# Patient Record
Sex: MALE | Race: WHITE | NOT HISPANIC OR LATINO | Employment: OTHER | URBAN - METROPOLITAN AREA
[De-identification: names, ages, dates, MRNs, and addresses within clinical notes are randomized per-mention and may not be internally consistent; named-entity substitution may affect disease eponyms.]

---

## 2017-06-07 ENCOUNTER — RECORDS - HEALTHEAST (OUTPATIENT)
Dept: LAB | Facility: CLINIC | Age: 72
End: 2017-06-07

## 2017-06-07 LAB
CHOLEST SERPL-MCNC: 178 MG/DL
FASTING STATUS PATIENT QL REPORTED: NO
HDLC SERPL-MCNC: 26 MG/DL
LDLC SERPL CALC-MCNC: 99 MG/DL
PSA SERPL-MCNC: 0.7 NG/ML (ref 0–6.5)
TRIGL SERPL-MCNC: 264 MG/DL

## 2017-06-16 DIAGNOSIS — Z96.641 STATUS POST TOTAL HIP REPLACEMENT, RIGHT: Primary | ICD-10-CM

## 2017-06-16 RX ORDER — AMOXICILLIN 500 MG/1
TABLET, FILM COATED ORAL
Qty: 4 TABLET | Refills: 3 | Status: ON HOLD | OUTPATIENT
Start: 2017-06-16 | End: 2019-05-03

## 2018-10-02 ENCOUNTER — RECORDS - HEALTHEAST (OUTPATIENT)
Dept: LAB | Facility: CLINIC | Age: 73
End: 2018-10-02

## 2018-10-02 LAB
ALBUMIN SERPL-MCNC: 4.1 G/DL (ref 3.5–5)
ALBUMIN UR-MCNC: NEGATIVE MG/DL
ALP SERPL-CCNC: 58 U/L (ref 45–120)
ALT SERPL W P-5'-P-CCNC: 18 U/L (ref 0–45)
ANION GAP SERPL CALCULATED.3IONS-SCNC: 8 MMOL/L (ref 5–18)
APPEARANCE UR: CLEAR
AST SERPL W P-5'-P-CCNC: 7 U/L (ref 0–40)
BASOPHILS # BLD AUTO: 0.1 THOU/UL (ref 0–0.2)
BASOPHILS NFR BLD AUTO: 1 % (ref 0–2)
BILIRUB SERPL-MCNC: 1.1 MG/DL (ref 0–1)
BILIRUB UR QL STRIP: NEGATIVE
BUN SERPL-MCNC: 25 MG/DL (ref 8–28)
CALCIUM SERPL-MCNC: 10.4 MG/DL (ref 8.5–10.5)
CHLORIDE BLD-SCNC: 107 MMOL/L (ref 98–107)
CO2 SERPL-SCNC: 28 MMOL/L (ref 22–31)
COLOR UR AUTO: NORMAL
CREAT SERPL-MCNC: 1.09 MG/DL (ref 0.7–1.3)
EOSINOPHIL # BLD AUTO: 0.3 THOU/UL (ref 0–0.4)
EOSINOPHIL NFR BLD AUTO: 4 % (ref 0–6)
ERYTHROCYTE [DISTWIDTH] IN BLOOD BY AUTOMATED COUNT: 12.6 % (ref 11–14.5)
GFR SERPL CREATININE-BSD FRML MDRD: >60 ML/MIN/1.73M2
GLUCOSE BLD-MCNC: 86 MG/DL (ref 70–125)
GLUCOSE UR STRIP-MCNC: NEGATIVE MG/DL
HCT VFR BLD AUTO: 48 % (ref 40–54)
HGB BLD-MCNC: 15.7 G/DL (ref 14–18)
HGB UR QL STRIP: NEGATIVE
KETONES UR STRIP-MCNC: NEGATIVE MG/DL
LEUKOCYTE ESTERASE UR QL STRIP: NEGATIVE
LYMPHOCYTES # BLD AUTO: 1.2 THOU/UL (ref 0.8–4.4)
LYMPHOCYTES NFR BLD AUTO: 15 % (ref 20–40)
MCH RBC QN AUTO: 30.8 PG (ref 27–34)
MCHC RBC AUTO-ENTMCNC: 32.7 G/DL (ref 32–36)
MCV RBC AUTO: 94 FL (ref 80–100)
MONOCYTES # BLD AUTO: 0.8 THOU/UL (ref 0–0.9)
MONOCYTES NFR BLD AUTO: 11 % (ref 2–10)
NEUTROPHILS # BLD AUTO: 5.3 THOU/UL (ref 2–7.7)
NEUTROPHILS NFR BLD AUTO: 70 % (ref 50–70)
NITRATE UR QL: NEGATIVE
PH UR STRIP: 5.5 [PH] (ref 4.5–8)
PLATELET # BLD AUTO: 263 THOU/UL (ref 140–440)
PMV BLD AUTO: 10.6 FL (ref 8.5–12.5)
POTASSIUM BLD-SCNC: 4.6 MMOL/L (ref 3.5–5)
PROT SERPL-MCNC: 7.2 G/DL (ref 6–8)
PSA SERPL-MCNC: 0.6 NG/ML (ref 0–6.5)
RBC # BLD AUTO: 5.1 MILL/UL (ref 4.4–6.2)
SODIUM SERPL-SCNC: 143 MMOL/L (ref 136–145)
SP GR UR STRIP: 1.01 (ref 1–1.03)
UROBILINOGEN UR STRIP-ACNC: NORMAL
WBC: 7.7 THOU/UL (ref 4–11)

## 2018-10-03 LAB
CHOLEST SERPL-MCNC: 180 MG/DL
HDLC SERPL-MCNC: 31 MG/DL
LDLC SERPL CALC-MCNC: 83 MG/DL
TRIGL SERPL-MCNC: 330 MG/DL

## 2019-04-02 ENCOUNTER — DOCUMENTATION ONLY (OUTPATIENT)
Dept: CARE COORDINATION | Facility: CLINIC | Age: 74
End: 2019-04-02

## 2019-04-04 ENCOUNTER — OFFICE VISIT (OUTPATIENT)
Dept: ORTHOPEDICS | Facility: CLINIC | Age: 74
End: 2019-04-04
Payer: MEDICARE

## 2019-04-04 ENCOUNTER — ANCILLARY PROCEDURE (OUTPATIENT)
Dept: GENERAL RADIOLOGY | Facility: CLINIC | Age: 74
End: 2019-04-04
Attending: ORTHOPAEDIC SURGERY
Payer: MEDICARE

## 2019-04-04 VITALS — HEIGHT: 70 IN | WEIGHT: 230 LBS | BODY MASS INDEX: 32.93 KG/M2

## 2019-04-04 DIAGNOSIS — M25.552 LEFT HIP PAIN: Primary | ICD-10-CM

## 2019-04-04 ASSESSMENT — ACTIVITIES OF DAILY LIVING (ADL)
ADL_SUBSCALE_SCORE: 52.94
ADL_SUM: 32
ADL_MEAN: 1.88

## 2019-04-04 ASSESSMENT — HOOS S4: HOW SEVERE IS YOUR HIP JOINT STIFFNESS AFTER FIRST WAKENING IN THE MORNING?: SEVERE

## 2019-04-04 ASSESSMENT — MIFFLIN-ST. JEOR: SCORE: 1789.52

## 2019-04-04 NOTE — LETTER
"4/4/2019       RE: Les Kraus  [address]  [facility] [address]     Dear Colleague,    Thank you for referring your patient, Les Kraus, to the [facility]. Please see a copy of my visit note below.    Chief Complaint: RECHECK (DOS: 8/17/16 Right MARC )     HPI: Les Kraus returns today in follow-up for his left hip. He reports that the right total hip is doing very well. The left side is very painful, limiting in his activities. He presents to discuss left total hip. He would like to have this done and his recovery complete for an August trip to the  to visit his daughter and grandchildren.     Medications and allergies are documented in the EMR and have been reviewed.    Current Outpatient Medications:      amLODIPine (NORVASC) 5 MG tablet, , Disp: , Rfl:      glucosamine-chondroitin 500-400 MG CAPS, Take 2 capsules by mouth daily, Disp: , Rfl:      amoxicillin (AMOXIL) 500 MG tablet, Take 4 tablets (2 g) 1 hour before dental procedure/cleaning. (Patient not taking: Reported on 4/4/2019), Disp: 4 tablet, Rfl: 3     ASPIRIN PO, Take 81 mg by mouth, Disp: , Rfl:      EPIPEN 2-ALEXX 0.3 MG/0.3ML injection, , Disp: , Rfl:      Multiple Vitamins-Minerals (MULTIVITAMIN & MINERAL PO), , Disp: , Rfl:      Psyllium (METAMUCIL PO), , Disp: , Rfl:   Allergies: Patient has no known allergies.    Physical Exam:  On physical examination the patient appears the stated age, is in no acute distress, affect is appropriate, and breathing is non-labored.  Ht 1.778 m (5' 10\")   Wt 104.3 kg (230 lb)   BMI 33.00 kg/m     Body mass index is 33 kg/m .  Gait: moderate to near severe antalgic gait favoring the left side   ROM: painful hip flexion, no IRF   Distally, the circulatory, motor, and sensation exam is intact with 5/5 EHL, gastroc-soleus, and tibialis anterior.  Sensation to light touch is intact.  Dorsalis pedis and posterior tibialis pulses are " palpable.  There are no sores on the feet, no bruising, and no lymphedema.    X-rays:    I reviewed the x-rays dated today.  Previous films reviewed.    Findings:  Normal progression for a right total hip arthroplasty without evidence of loosening or subsidence. The left hip shows end-stage OA with complete joint space destruction     Assessment: right hip doing well. Debilitating left hip pain associate with left hip joint space destruction. His desire to proceed with total hip is a reasonable one. We discussed the procedure and reviewed all the risks and benefits. All the patients questions were answered to the best of my ability.   Plan: left total hip    Referred Self    Again, thank you for allowing me to participate in the care of your patient.      Sincerely,    Espinoza Zaragoza MD

## 2019-04-04 NOTE — NURSING NOTE
"Reason For Visit:   Chief Complaint   Patient presents with     RECHECK     DOS: 8/17/16 Right MARC        Ht 1.778 m (5' 10\")   Wt 104.3 kg (230 lb)   BMI 33.00 kg/m      Pain Assessment  Patient Currently in Pain: Yes  0-10 Pain Scale: 4  Primary Pain Location: Hip(left hip anterior pain )    rAchie Monk ATC  "

## 2019-04-04 NOTE — PROGRESS NOTES
"Chief Complaint: RECHECK (DOS: 8/17/16 Right MARC )       HPI: Les Kraus returns today in follow-up for his left hip. He reports that the right total hip is doing very well. The left side is very painful, limiting in his activities. He presents to discuss left total hip. He would like to have this done and his recovery complete for an August trip to the  to visit his daughter and grandchildren.     Medications and allergies are documented in the EMR and have been reviewed.    Current Outpatient Medications:      amLODIPine (NORVASC) 5 MG tablet, , Disp: , Rfl:      glucosamine-chondroitin 500-400 MG CAPS, Take 2 capsules by mouth daily, Disp: , Rfl:      amoxicillin (AMOXIL) 500 MG tablet, Take 4 tablets (2 g) 1 hour before dental procedure/cleaning. (Patient not taking: Reported on 4/4/2019), Disp: 4 tablet, Rfl: 3     ASPIRIN PO, Take 81 mg by mouth, Disp: , Rfl:      EPIPEN 2-ALEXX 0.3 MG/0.3ML injection, , Disp: , Rfl:      Multiple Vitamins-Minerals (MULTIVITAMIN & MINERAL PO), , Disp: , Rfl:      Psyllium (METAMUCIL PO), , Disp: , Rfl:   Allergies: Patient has no known allergies.    Physical Exam:  On physical examination the patient appears the stated age, is in no acute distress, affect is appropriate, and breathing is non-labored.  Ht 1.778 m (5' 10\")   Wt 104.3 kg (230 lb)   BMI 33.00 kg/m    Body mass index is 33 kg/m .  Gait: moderate to near severe antalgic gait favoring the left side   ROM: painful hip flexion, no IRF   Distally, the circulatory, motor, and sensation exam is intact with 5/5 EHL, gastroc-soleus, and tibialis anterior.  Sensation to light touch is intact.  Dorsalis pedis and posterior tibialis pulses are palpable.  There are no sores on the feet, no bruising, and no lymphedema.    X-rays:    I reviewed the x-rays dated today.  Previous films reviewed.    Findings:  Normal progression for a right total hip arthroplasty without evidence of loosening or subsidence. The left hip shows " end-stage OA with complete joint space destruction     Assessment: right hip doing well. Debilitating left hip pain associate with left hip joint space destruction. His desire to proceed with total hip is a reasonable one. We discussed the procedure and reviewed all the risks and benefits. All the patients questions were answered to the best of my ability.   Plan: left total hip      Referred Self

## 2019-04-05 NOTE — NURSING NOTE
Teaching Flowsheet   Relevant Diagnosis: Left hip osteoarthritis  Teaching Topic: Left total hip arthroplasty.    Patient lives alone in [place]; he will contact his sister to see if she can come up to stay for after surgery; patient is checking with his supplemental insurance regarding TCU coverage. Health history positive for HTN on amlodipine; otherwise, health history negative. Patient had his right hip replaced 3 years ago.     Person(s) involved in teaching:   Patient     Motivation Level:  Asks Questions: Yes  Eager to Learn: Yes  Cooperative: Yes  Receptive (willing/able to accept information): Yes  Any cultural factors/Taoist beliefs that may influence understanding or compliance? No     Patient demonstrates understanding of the following:  Reason for the appointment, diagnosis and treatment plan: Yes  Knowledge of proper use of medications and conditions for which they are ordered (with special attention to potential side effects or drug interactions): Yes  Which situations necessitate calling provider and whom to contact: Yes     Teaching Concerns Addressed: Patent understands he will need a preoperative H&P within 30 days of the date of surgery.      Proper use and care of assistive devices. (medical equip, care aids, etc.): Yes  Nutritional needs and diet plan: NA  Pain management techniques: Yes  Wound Care: Yes  How and/when to access community resources: Yes     Instructional Materials Used/Given: Preoperative teaching packet, surgical soap, dental card.

## 2019-04-08 ENCOUNTER — DOCUMENTATION ONLY (OUTPATIENT)
Dept: ORTHOPEDICS | Facility: CLINIC | Age: 74
End: 2019-04-08

## 2019-04-23 ENCOUNTER — RECORDS - HEALTHEAST (OUTPATIENT)
Dept: LAB | Facility: CLINIC | Age: 74
End: 2019-04-23

## 2019-04-23 LAB
ALBUMIN SERPL-MCNC: 4.1 G/DL (ref 3.5–5)
ALP SERPL-CCNC: 52 U/L (ref 45–120)
ALT SERPL W P-5'-P-CCNC: 50 U/L (ref 0–45)
ANION GAP SERPL CALCULATED.3IONS-SCNC: 11 MMOL/L (ref 5–18)
AST SERPL W P-5'-P-CCNC: 17 U/L (ref 0–40)
BASOPHILS # BLD AUTO: 0.1 THOU/UL (ref 0–0.2)
BASOPHILS NFR BLD AUTO: 1 % (ref 0–2)
BILIRUB SERPL-MCNC: 0.7 MG/DL (ref 0–1)
BUN SERPL-MCNC: 22 MG/DL (ref 8–28)
CALCIUM SERPL-MCNC: 10.1 MG/DL (ref 8.5–10.5)
CHLORIDE BLD-SCNC: 110 MMOL/L (ref 98–107)
CO2 SERPL-SCNC: 22 MMOL/L (ref 22–31)
CREAT SERPL-MCNC: 1.05 MG/DL (ref 0.7–1.3)
EOSINOPHIL # BLD AUTO: 0.2 THOU/UL (ref 0–0.4)
EOSINOPHIL NFR BLD AUTO: 3 % (ref 0–6)
ERYTHROCYTE [DISTWIDTH] IN BLOOD BY AUTOMATED COUNT: 12.9 % (ref 11–14.5)
GFR SERPL CREATININE-BSD FRML MDRD: >60 ML/MIN/1.73M2
GLUCOSE BLD-MCNC: 95 MG/DL (ref 70–125)
HCT VFR BLD AUTO: 47.8 % (ref 40–54)
HGB BLD-MCNC: 15.5 G/DL (ref 14–18)
HGB BLD-MCNC: 15.7 G/DL (ref 14–18)
LYMPHOCYTES # BLD AUTO: 1 THOU/UL (ref 0.8–4.4)
LYMPHOCYTES NFR BLD AUTO: 14 % (ref 20–40)
MCH RBC QN AUTO: 30.6 PG (ref 27–34)
MCHC RBC AUTO-ENTMCNC: 32.8 G/DL (ref 32–36)
MCV RBC AUTO: 93 FL (ref 80–100)
MONOCYTES # BLD AUTO: 0.9 THOU/UL (ref 0–0.9)
MONOCYTES NFR BLD AUTO: 12 % (ref 2–10)
NEUTROPHILS # BLD AUTO: 5.2 THOU/UL (ref 2–7.7)
NEUTROPHILS NFR BLD AUTO: 71 % (ref 50–70)
PLATELET # BLD AUTO: 254 THOU/UL (ref 140–440)
PMV BLD AUTO: 10.1 FL (ref 8.5–12.5)
POTASSIUM BLD-SCNC: 4.2 MMOL/L (ref 3.5–5)
PROT SERPL-MCNC: 7.1 G/DL (ref 6–8)
RBC # BLD AUTO: 5.13 MILL/UL (ref 4.4–6.2)
SODIUM SERPL-SCNC: 143 MMOL/L (ref 136–145)
WBC: 7.4 THOU/UL (ref 4–11)

## 2019-04-29 NOTE — OR NURSING
Pt had questions about Rehab after surgery. Called and left message for [facility] for Dr. Zaragoza relaying patients concerns and if patient needs to be contacted about that pre-op or if Dr. Zaragoza will address it when he is inpatient post-op. Left PAN # for callback.

## 2019-05-01 ENCOUNTER — APPOINTMENT (OUTPATIENT)
Dept: GENERAL RADIOLOGY | Facility: CLINIC | Age: 74
DRG: 470 | End: 2019-05-01
Attending: ORTHOPAEDIC SURGERY
Payer: MEDICARE

## 2019-05-01 ENCOUNTER — ANESTHESIA EVENT (OUTPATIENT)
Dept: SURGERY | Facility: CLINIC | Age: 74
DRG: 470 | End: 2019-05-01
Payer: MEDICARE

## 2019-05-01 ENCOUNTER — HOSPITAL ENCOUNTER (INPATIENT)
Facility: CLINIC | Age: 74
LOS: 3 days | Discharge: NURSING, REHAB OR LONG-TERM CARE | DRG: 470 | End: 2019-05-04
Attending: ORTHOPAEDIC SURGERY | Admitting: ORTHOPAEDIC SURGERY
Payer: MEDICARE

## 2019-05-01 ENCOUNTER — ANESTHESIA (OUTPATIENT)
Dept: SURGERY | Facility: CLINIC | Age: 74
DRG: 470 | End: 2019-05-01
Payer: MEDICARE

## 2019-05-01 ENCOUNTER — APPOINTMENT (OUTPATIENT)
Dept: PHYSICAL THERAPY | Facility: CLINIC | Age: 74
DRG: 470 | End: 2019-05-01
Attending: ORTHOPAEDIC SURGERY
Payer: MEDICARE

## 2019-05-01 DIAGNOSIS — Z98.890 STATUS POST HIP SURGERY: Primary | ICD-10-CM

## 2019-05-01 LAB
ABO + RH BLD: NORMAL
ABO + RH BLD: NORMAL
ALBUMIN UR-MCNC: 10 MG/DL
APPEARANCE UR: CLEAR
BILIRUB UR QL STRIP: NEGATIVE
BLD GP AB SCN SERPL QL: NORMAL
BLOOD BANK CMNT PATIENT-IMP: NORMAL
COLOR UR AUTO: YELLOW
CREAT SERPL-MCNC: 1.12 MG/DL (ref 0.66–1.25)
GFR SERPL CREATININE-BSD FRML MDRD: 64 ML/MIN/{1.73_M2}
GLUCOSE BLDC GLUCOMTR-MCNC: 95 MG/DL (ref 70–99)
GLUCOSE UR STRIP-MCNC: NEGATIVE MG/DL
HGB UR QL STRIP: NEGATIVE
KETONES UR STRIP-MCNC: NEGATIVE MG/DL
LEUKOCYTE ESTERASE UR QL STRIP: NEGATIVE
MUCOUS THREADS #/AREA URNS LPF: PRESENT /LPF
NITRATE UR QL: NEGATIVE
PH UR STRIP: 5.5 PH (ref 5–7)
PLATELET # BLD AUTO: 221 10E9/L (ref 150–450)
RBC #/AREA URNS AUTO: 2 /HPF (ref 0–2)
SOURCE: ABNORMAL
SP GR UR STRIP: 1.02 (ref 1–1.03)
SPECIMEN EXP DATE BLD: NORMAL
UROBILINOGEN UR STRIP-MCNC: NORMAL MG/DL (ref 0–2)
WBC #/AREA URNS AUTO: 1 /HPF (ref 0–5)

## 2019-05-01 PROCEDURE — A9270 NON-COVERED ITEM OR SERVICE: HCPCS | Performed by: STUDENT IN AN ORGANIZED HEALTH CARE EDUCATION/TRAINING PROGRAM

## 2019-05-01 PROCEDURE — 36000062 ZZH SURGERY LEVEL 4 1ST 30 MIN - UMMC: Performed by: ORTHOPAEDIC SURGERY

## 2019-05-01 PROCEDURE — 27210794 ZZH OR GENERAL SUPPLY STERILE: Performed by: ORTHOPAEDIC SURGERY

## 2019-05-01 PROCEDURE — 36000064 ZZH SURGERY LEVEL 4 EA 15 ADDTL MIN - UMMC: Performed by: ORTHOPAEDIC SURGERY

## 2019-05-01 PROCEDURE — 25800025 ZZH RX 258: Performed by: ORTHOPAEDIC SURGERY

## 2019-05-01 PROCEDURE — 85049 AUTOMATED PLATELET COUNT: CPT | Performed by: ORTHOPAEDIC SURGERY

## 2019-05-01 PROCEDURE — 25000128 H RX IP 250 OP 636: Performed by: NURSE ANESTHETIST, CERTIFIED REGISTERED

## 2019-05-01 PROCEDURE — 25000566 ZZH SEVOFLURANE, EA 15 MIN: Performed by: ORTHOPAEDIC SURGERY

## 2019-05-01 PROCEDURE — 97530 THERAPEUTIC ACTIVITIES: CPT | Mod: GP | Performed by: PHYSICAL THERAPIST

## 2019-05-01 PROCEDURE — 25000125 ZZHC RX 250: Performed by: ORTHOPAEDIC SURGERY

## 2019-05-01 PROCEDURE — 82565 ASSAY OF CREATININE: CPT | Performed by: ORTHOPAEDIC SURGERY

## 2019-05-01 PROCEDURE — 86901 BLOOD TYPING SEROLOGIC RH(D): CPT | Performed by: ORTHOPAEDIC SURGERY

## 2019-05-01 PROCEDURE — 25000125 ZZHC RX 250: Performed by: NURSE ANESTHETIST, CERTIFIED REGISTERED

## 2019-05-01 PROCEDURE — 25800030 ZZH RX IP 258 OP 636: Performed by: ANESTHESIOLOGY

## 2019-05-01 PROCEDURE — 86900 BLOOD TYPING SEROLOGIC ABO: CPT | Performed by: ORTHOPAEDIC SURGERY

## 2019-05-01 PROCEDURE — 97110 THERAPEUTIC EXERCISES: CPT | Mod: GP | Performed by: PHYSICAL THERAPIST

## 2019-05-01 PROCEDURE — 71000014 ZZH RECOVERY PHASE 1 LEVEL 2 FIRST HR: Performed by: ORTHOPAEDIC SURGERY

## 2019-05-01 PROCEDURE — C1713 ANCHOR/SCREW BN/BN,TIS/BN: HCPCS | Performed by: ORTHOPAEDIC SURGERY

## 2019-05-01 PROCEDURE — 12000001 ZZH R&B MED SURG/OB UMMC

## 2019-05-01 PROCEDURE — 37000009 ZZH ANESTHESIA TECHNICAL FEE, EACH ADDTL 15 MIN: Performed by: ORTHOPAEDIC SURGERY

## 2019-05-01 PROCEDURE — A9270 NON-COVERED ITEM OR SERVICE: HCPCS | Performed by: ORTHOPAEDIC SURGERY

## 2019-05-01 PROCEDURE — 99207 ZZC CONSULT E&M CHANGED TO INITIAL LEVEL: CPT | Performed by: PHYSICIAN ASSISTANT

## 2019-05-01 PROCEDURE — 40000275 ZZH STATISTIC RCP TIME EA 10 MIN

## 2019-05-01 PROCEDURE — 25000128 H RX IP 250 OP 636: Performed by: ORTHOPAEDIC SURGERY

## 2019-05-01 PROCEDURE — 86850 RBC ANTIBODY SCREEN: CPT | Performed by: ORTHOPAEDIC SURGERY

## 2019-05-01 PROCEDURE — 25000132 ZZH RX MED GY IP 250 OP 250 PS 637: Performed by: ORTHOPAEDIC SURGERY

## 2019-05-01 PROCEDURE — 0SRB02A REPLACEMENT OF LEFT HIP JOINT WITH METAL ON POLYETHYLENE SYNTHETIC SUBSTITUTE, UNCEMENTED, OPEN APPROACH: ICD-10-PCS | Performed by: ORTHOPAEDIC SURGERY

## 2019-05-01 PROCEDURE — 25800030 ZZH RX IP 258 OP 636: Performed by: PHYSICIAN ASSISTANT

## 2019-05-01 PROCEDURE — 99222 1ST HOSP IP/OBS MODERATE 55: CPT | Performed by: PHYSICIAN ASSISTANT

## 2019-05-01 PROCEDURE — 25800030 ZZH RX IP 258 OP 636: Performed by: ORTHOPAEDIC SURGERY

## 2019-05-01 PROCEDURE — 36415 COLL VENOUS BLD VENIPUNCTURE: CPT | Performed by: ORTHOPAEDIC SURGERY

## 2019-05-01 PROCEDURE — 25000132 ZZH RX MED GY IP 250 OP 250 PS 637: Performed by: STUDENT IN AN ORGANIZED HEALTH CARE EDUCATION/TRAINING PROGRAM

## 2019-05-01 PROCEDURE — C1776 JOINT DEVICE (IMPLANTABLE): HCPCS | Performed by: ORTHOPAEDIC SURGERY

## 2019-05-01 PROCEDURE — 25000128 H RX IP 250 OP 636: Performed by: STUDENT IN AN ORGANIZED HEALTH CARE EDUCATION/TRAINING PROGRAM

## 2019-05-01 PROCEDURE — 81001 URINALYSIS AUTO W/SCOPE: CPT | Performed by: ORTHOPAEDIC SURGERY

## 2019-05-01 PROCEDURE — 97161 PT EVAL LOW COMPLEX 20 MIN: CPT | Mod: GP | Performed by: PHYSICAL THERAPIST

## 2019-05-01 PROCEDURE — 00000146 ZZHCL STATISTIC GLUCOSE BY METER IP

## 2019-05-01 PROCEDURE — 37000008 ZZH ANESTHESIA TECHNICAL FEE, 1ST 30 MIN: Performed by: ORTHOPAEDIC SURGERY

## 2019-05-01 PROCEDURE — 40000170 ZZH STATISTIC PRE-PROCEDURE ASSESSMENT II: Performed by: ORTHOPAEDIC SURGERY

## 2019-05-01 PROCEDURE — 25800030 ZZH RX IP 258 OP 636: Performed by: NURSE ANESTHETIST, CERTIFIED REGISTERED

## 2019-05-01 PROCEDURE — 40000986 XR PELVIS AND HIP PORTABLE LEFT 2 VIEWS

## 2019-05-01 PROCEDURE — 25000128 H RX IP 250 OP 636: Performed by: ANESTHESIOLOGY

## 2019-05-01 DEVICE — IMP SCR ACET SNN SPHERICAL HEAD 6.5X25MM 71332525: Type: IMPLANTABLE DEVICE | Site: HIP | Status: FUNCTIONAL

## 2019-05-01 DEVICE — IMP STEM FEM HIP SNN SYNERGY POROUS SZ 12 HO 71306112: Type: IMPLANTABLE DEVICE | Site: HIP | Status: FUNCTIONAL

## 2019-05-01 DEVICE — IMP LINER SNR ACET R3 XLPE 0DEG 36X56MM 71332756: Type: IMPLANTABLE DEVICE | Site: HIP | Status: FUNCTIONAL

## 2019-05-01 DEVICE — IMP HEAD FEMORAL SNN BIPOLAR COBALT 36MM +8 71303608: Type: IMPLANTABLE DEVICE | Site: HIP | Status: FUNCTIONAL

## 2019-05-01 DEVICE — IMP SCR ACET SNN SPHERICAL HEAD 6.5X40MM 71332540: Type: IMPLANTABLE DEVICE | Site: HIP | Status: FUNCTIONAL

## 2019-05-01 DEVICE — IMP SHELL SNR ACET R3 3H 56MM 71335556: Type: IMPLANTABLE DEVICE | Site: HIP | Status: FUNCTIONAL

## 2019-05-01 RX ORDER — FENTANYL CITRATE 50 UG/ML
25-50 INJECTION, SOLUTION INTRAMUSCULAR; INTRAVENOUS
Status: DISCONTINUED | OUTPATIENT
Start: 2019-05-01 | End: 2019-05-01 | Stop reason: HOSPADM

## 2019-05-01 RX ORDER — HYDROMORPHONE HYDROCHLORIDE 1 MG/ML
.3-.5 INJECTION, SOLUTION INTRAMUSCULAR; INTRAVENOUS; SUBCUTANEOUS
Status: DISCONTINUED | OUTPATIENT
Start: 2019-05-01 | End: 2019-05-04 | Stop reason: HOSPADM

## 2019-05-01 RX ORDER — KETOROLAC TROMETHAMINE 30 MG/ML
INJECTION, SOLUTION INTRAMUSCULAR; INTRAVENOUS PRN
Status: DISCONTINUED | OUTPATIENT
Start: 2019-05-01 | End: 2019-05-01

## 2019-05-01 RX ORDER — BISACODYL 10 MG
10 SUPPOSITORY, RECTAL RECTAL DAILY
Status: DISCONTINUED | OUTPATIENT
Start: 2019-05-02 | End: 2019-05-04 | Stop reason: HOSPADM

## 2019-05-01 RX ORDER — ONDANSETRON 4 MG/1
4 TABLET, ORALLY DISINTEGRATING ORAL EVERY 6 HOURS PRN
Status: DISCONTINUED | OUTPATIENT
Start: 2019-05-01 | End: 2019-05-04 | Stop reason: HOSPADM

## 2019-05-01 RX ORDER — DIPHENHYDRAMINE HCL 12.5MG/5ML
12.5 LIQUID (ML) ORAL EVERY 6 HOURS PRN
Status: DISCONTINUED | OUTPATIENT
Start: 2019-05-01 | End: 2019-05-01

## 2019-05-01 RX ORDER — PROPOFOL 10 MG/ML
INJECTION, EMULSION INTRAVENOUS PRN
Status: DISCONTINUED | OUTPATIENT
Start: 2019-05-01 | End: 2019-05-01

## 2019-05-01 RX ORDER — ONDANSETRON 2 MG/ML
INJECTION INTRAMUSCULAR; INTRAVENOUS PRN
Status: DISCONTINUED | OUTPATIENT
Start: 2019-05-01 | End: 2019-05-01

## 2019-05-01 RX ORDER — OXYCODONE HYDROCHLORIDE 5 MG/1
5-10 TABLET ORAL EVERY 4 HOURS PRN
Status: DISCONTINUED | OUTPATIENT
Start: 2019-05-01 | End: 2019-05-04 | Stop reason: HOSPADM

## 2019-05-01 RX ORDER — LIDOCAINE 40 MG/G
CREAM TOPICAL
Status: DISCONTINUED | OUTPATIENT
Start: 2019-05-01 | End: 2019-05-04 | Stop reason: HOSPADM

## 2019-05-01 RX ORDER — SODIUM CHLORIDE, SODIUM LACTATE, POTASSIUM CHLORIDE, CALCIUM CHLORIDE 600; 310; 30; 20 MG/100ML; MG/100ML; MG/100ML; MG/100ML
INJECTION, SOLUTION INTRAVENOUS CONTINUOUS
Status: DISCONTINUED | OUTPATIENT
Start: 2019-05-01 | End: 2019-05-01 | Stop reason: HOSPADM

## 2019-05-01 RX ORDER — NALOXONE HYDROCHLORIDE 0.4 MG/ML
.1-.4 INJECTION, SOLUTION INTRAMUSCULAR; INTRAVENOUS; SUBCUTANEOUS
Status: DISCONTINUED | OUTPATIENT
Start: 2019-05-01 | End: 2019-05-01

## 2019-05-01 RX ORDER — GABAPENTIN 100 MG/1
100 CAPSULE ORAL
Status: COMPLETED | OUTPATIENT
Start: 2019-05-01 | End: 2019-05-01

## 2019-05-01 RX ORDER — METHOCARBAMOL 500 MG/1
500 TABLET, FILM COATED ORAL 4 TIMES DAILY PRN
Status: DISCONTINUED | OUTPATIENT
Start: 2019-05-01 | End: 2019-05-04 | Stop reason: HOSPADM

## 2019-05-01 RX ORDER — CEFAZOLIN SODIUM 2 G/100ML
2 INJECTION, SOLUTION INTRAVENOUS
Status: COMPLETED | OUTPATIENT
Start: 2019-05-01 | End: 2019-05-01

## 2019-05-01 RX ORDER — CEFAZOLIN SODIUM 2 G/100ML
2 INJECTION, SOLUTION INTRAVENOUS EVERY 8 HOURS
Status: COMPLETED | OUTPATIENT
Start: 2019-05-01 | End: 2019-05-02

## 2019-05-01 RX ORDER — ONDANSETRON 2 MG/ML
4 INJECTION INTRAMUSCULAR; INTRAVENOUS EVERY 6 HOURS PRN
Status: DISCONTINUED | OUTPATIENT
Start: 2019-05-01 | End: 2019-05-04 | Stop reason: HOSPADM

## 2019-05-01 RX ORDER — ACETAMINOPHEN 325 MG/1
975 TABLET ORAL EVERY 8 HOURS
Status: DISCONTINUED | OUTPATIENT
Start: 2019-05-01 | End: 2019-05-04 | Stop reason: HOSPADM

## 2019-05-01 RX ORDER — AMOXICILLIN 250 MG
2 CAPSULE ORAL 2 TIMES DAILY
Status: DISCONTINUED | OUTPATIENT
Start: 2019-05-01 | End: 2019-05-04 | Stop reason: HOSPADM

## 2019-05-01 RX ORDER — FENTANYL CITRATE 50 UG/ML
INJECTION, SOLUTION INTRAMUSCULAR; INTRAVENOUS PRN
Status: DISCONTINUED | OUTPATIENT
Start: 2019-05-01 | End: 2019-05-01

## 2019-05-01 RX ORDER — LIDOCAINE HYDROCHLORIDE 20 MG/ML
INJECTION, SOLUTION INFILTRATION; PERINEURAL PRN
Status: DISCONTINUED | OUTPATIENT
Start: 2019-05-01 | End: 2019-05-01

## 2019-05-01 RX ORDER — ONDANSETRON 2 MG/ML
4 INJECTION INTRAMUSCULAR; INTRAVENOUS EVERY 30 MIN PRN
Status: DISCONTINUED | OUTPATIENT
Start: 2019-05-01 | End: 2019-05-01 | Stop reason: HOSPADM

## 2019-05-01 RX ORDER — CELECOXIB 200 MG/1
200 CAPSULE ORAL ONCE
Status: COMPLETED | OUTPATIENT
Start: 2019-05-01 | End: 2019-05-01

## 2019-05-01 RX ORDER — DEXAMETHASONE SODIUM PHOSPHATE 4 MG/ML
INJECTION, SOLUTION INTRA-ARTICULAR; INTRALESIONAL; INTRAMUSCULAR; INTRAVENOUS; SOFT TISSUE PRN
Status: DISCONTINUED | OUTPATIENT
Start: 2019-05-01 | End: 2019-05-01

## 2019-05-01 RX ORDER — AMLODIPINE BESYLATE 5 MG/1
5 TABLET ORAL DAILY
Status: DISCONTINUED | OUTPATIENT
Start: 2019-05-02 | End: 2019-05-04 | Stop reason: HOSPADM

## 2019-05-01 RX ORDER — ACETAMINOPHEN 325 MG/1
975 TABLET ORAL ONCE
Status: COMPLETED | OUTPATIENT
Start: 2019-05-01 | End: 2019-05-01

## 2019-05-01 RX ORDER — AMOXICILLIN 250 MG
1 CAPSULE ORAL 2 TIMES DAILY
Status: DISCONTINUED | OUTPATIENT
Start: 2019-05-01 | End: 2019-05-04 | Stop reason: HOSPADM

## 2019-05-01 RX ORDER — CEFAZOLIN SODIUM 1 G/3ML
1 INJECTION, POWDER, FOR SOLUTION INTRAMUSCULAR; INTRAVENOUS SEE ADMIN INSTRUCTIONS
Status: DISCONTINUED | OUTPATIENT
Start: 2019-05-01 | End: 2019-05-01 | Stop reason: HOSPADM

## 2019-05-01 RX ORDER — DIPHENHYDRAMINE HYDROCHLORIDE 50 MG/ML
12.5 INJECTION INTRAMUSCULAR; INTRAVENOUS EVERY 6 HOURS PRN
Status: DISCONTINUED | OUTPATIENT
Start: 2019-05-01 | End: 2019-05-01

## 2019-05-01 RX ORDER — NALOXONE HYDROCHLORIDE 0.4 MG/ML
.1-.4 INJECTION, SOLUTION INTRAMUSCULAR; INTRAVENOUS; SUBCUTANEOUS
Status: DISCONTINUED | OUTPATIENT
Start: 2019-05-01 | End: 2019-05-02

## 2019-05-01 RX ORDER — LIDOCAINE 4 G/G
1-3 PATCH TOPICAL
Status: DISCONTINUED | OUTPATIENT
Start: 2019-05-02 | End: 2019-05-04 | Stop reason: HOSPADM

## 2019-05-01 RX ORDER — HYDROMORPHONE HYDROCHLORIDE 1 MG/ML
.3-.5 INJECTION, SOLUTION INTRAMUSCULAR; INTRAVENOUS; SUBCUTANEOUS EVERY 5 MIN PRN
Status: DISCONTINUED | OUTPATIENT
Start: 2019-05-01 | End: 2019-05-01 | Stop reason: HOSPADM

## 2019-05-01 RX ORDER — MAGNESIUM HYDROXIDE 1200 MG/15ML
LIQUID ORAL PRN
Status: DISCONTINUED | OUTPATIENT
Start: 2019-05-01 | End: 2019-05-01 | Stop reason: HOSPADM

## 2019-05-01 RX ORDER — SODIUM CHLORIDE, SODIUM LACTATE, POTASSIUM CHLORIDE, CALCIUM CHLORIDE 600; 310; 30; 20 MG/100ML; MG/100ML; MG/100ML; MG/100ML
INJECTION, SOLUTION INTRAVENOUS CONTINUOUS
Status: DISCONTINUED | OUTPATIENT
Start: 2019-05-01 | End: 2019-05-03

## 2019-05-01 RX ORDER — ONDANSETRON 4 MG/1
4 TABLET, ORALLY DISINTEGRATING ORAL EVERY 30 MIN PRN
Status: DISCONTINUED | OUTPATIENT
Start: 2019-05-01 | End: 2019-05-01 | Stop reason: HOSPADM

## 2019-05-01 RX ORDER — ACETAMINOPHEN 325 MG/1
650 TABLET ORAL EVERY 4 HOURS PRN
Status: DISCONTINUED | OUTPATIENT
Start: 2019-05-04 | End: 2019-05-04 | Stop reason: HOSPADM

## 2019-05-01 RX ADMIN — PROPOFOL 200 MG: 10 INJECTION, EMULSION INTRAVENOUS at 07:35

## 2019-05-01 RX ADMIN — PHENYLEPHRINE HYDROCHLORIDE 0.3 MCG/KG/MIN: 10 INJECTION, SOLUTION INTRAMUSCULAR; INTRAVENOUS; SUBCUTANEOUS at 08:32

## 2019-05-01 RX ADMIN — ONDANSETRON 4 MG: 2 INJECTION INTRAMUSCULAR; INTRAVENOUS at 09:34

## 2019-05-01 RX ADMIN — MAGNESIUM HYDROXIDE 15 ML: 400 SUSPENSION ORAL at 19:35

## 2019-05-01 RX ADMIN — SODIUM CHLORIDE 1 G: 9 INJECTION, SOLUTION INTRAVENOUS at 07:46

## 2019-05-01 RX ADMIN — MIDAZOLAM 2 MG: 1 INJECTION INTRAMUSCULAR; INTRAVENOUS at 07:25

## 2019-05-01 RX ADMIN — ACETAMINOPHEN 975 MG: 325 TABLET, FILM COATED ORAL at 06:26

## 2019-05-01 RX ADMIN — ROCURONIUM BROMIDE 20 MG: 10 INJECTION INTRAVENOUS at 09:00

## 2019-05-01 RX ADMIN — ROCURONIUM BROMIDE 50 MG: 10 INJECTION INTRAVENOUS at 07:35

## 2019-05-01 RX ADMIN — DEXAMETHASONE SODIUM PHOSPHATE 8 MG: 4 INJECTION, SOLUTION INTRAMUSCULAR; INTRAVENOUS at 08:34

## 2019-05-01 RX ADMIN — Medication 0.5 MG: at 11:00

## 2019-05-01 RX ADMIN — FENTANYL CITRATE 50 MCG: 50 INJECTION, SOLUTION INTRAMUSCULAR; INTRAVENOUS at 08:07

## 2019-05-01 RX ADMIN — PHENYLEPHRINE HYDROCHLORIDE 50 MCG: 10 INJECTION, SOLUTION INTRAMUSCULAR; INTRAVENOUS; SUBCUTANEOUS at 08:17

## 2019-05-01 RX ADMIN — ACETAMINOPHEN 975 MG: 325 TABLET, FILM COATED ORAL at 19:35

## 2019-05-01 RX ADMIN — PHENYLEPHRINE HYDROCHLORIDE 100 MCG: 10 INJECTION, SOLUTION INTRAMUSCULAR; INTRAVENOUS; SUBCUTANEOUS at 08:27

## 2019-05-01 RX ADMIN — GABAPENTIN 100 MG: 100 CAPSULE ORAL at 06:26

## 2019-05-01 RX ADMIN — CEFAZOLIN 1 G: 1 INJECTION, POWDER, FOR SOLUTION INTRAMUSCULAR; INTRAVENOUS at 09:30

## 2019-05-01 RX ADMIN — ROCURONIUM BROMIDE 20 MG: 10 INJECTION INTRAVENOUS at 08:26

## 2019-05-01 RX ADMIN — CEFAZOLIN SODIUM 2 G: 2 INJECTION, SOLUTION INTRAVENOUS at 17:14

## 2019-05-01 RX ADMIN — SODIUM CHLORIDE 1 G: 9 INJECTION, SOLUTION INTRAVENOUS at 09:26

## 2019-05-01 RX ADMIN — SODIUM CHLORIDE, POTASSIUM CHLORIDE, SODIUM LACTATE AND CALCIUM CHLORIDE: 600; 310; 30; 20 INJECTION, SOLUTION INTRAVENOUS at 07:27

## 2019-05-01 RX ADMIN — FENTANYL CITRATE 100 MCG: 50 INJECTION, SOLUTION INTRAMUSCULAR; INTRAVENOUS at 07:35

## 2019-05-01 RX ADMIN — KETOROLAC TROMETHAMINE 15 MG: 30 INJECTION, SOLUTION INTRAMUSCULAR at 09:41

## 2019-05-01 RX ADMIN — PHENYLEPHRINE HYDROCHLORIDE 100 MCG: 10 INJECTION, SOLUTION INTRAMUSCULAR; INTRAVENOUS; SUBCUTANEOUS at 07:57

## 2019-05-01 RX ADMIN — CELECOXIB 200 MG: 200 CAPSULE ORAL at 06:26

## 2019-05-01 RX ADMIN — FAMOTIDINE 20 MG: 10 INJECTION, SOLUTION INTRAVENOUS at 09:30

## 2019-05-01 RX ADMIN — CEFAZOLIN SODIUM 2 G: 2 INJECTION, SOLUTION INTRAVENOUS at 07:35

## 2019-05-01 RX ADMIN — SODIUM CHLORIDE, POTASSIUM CHLORIDE, SODIUM LACTATE AND CALCIUM CHLORIDE: 600; 310; 30; 20 INJECTION, SOLUTION INTRAVENOUS at 22:36

## 2019-05-01 RX ADMIN — SENNOSIDES AND DOCUSATE SODIUM 2 TABLET: 8.6; 5 TABLET ORAL at 19:35

## 2019-05-01 RX ADMIN — SUGAMMADEX 300 MG: 100 INJECTION, SOLUTION INTRAVENOUS at 10:17

## 2019-05-01 RX ADMIN — LIDOCAINE HYDROCHLORIDE 100 MG: 20 INJECTION, SOLUTION INFILTRATION; PERINEURAL at 07:35

## 2019-05-01 ASSESSMENT — ACTIVITIES OF DAILY LIVING (ADL)
DRESS: 0-->INDEPENDENT
FALL_HISTORY_WITHIN_LAST_SIX_MONTHS: NO
SWALLOWING: 0-->SWALLOWS FOODS/LIQUIDS WITHOUT DIFFICULTY
AMBULATION: 0-->INDEPENDENT
RETIRED_COMMUNICATION: 0-->UNDERSTANDS/COMMUNICATES WITHOUT DIFFICULTY
TRANSFERRING: 0-->INDEPENDENT
RETIRED_EATING: 0-->INDEPENDENT
COGNITION: 0 - NO COGNITION ISSUES REPORTED
ADLS_ACUITY_SCORE: 12
TOILETING: 0-->INDEPENDENT
ADLS_ACUITY_SCORE: 12
BATHING: 0-->INDEPENDENT

## 2019-05-01 NOTE — PLAN OF CARE
Discharge Planner PT   Patient plan for discharge: TCU  Current status: PT evaluation complete and treatment initiated patient agreeable to treatment. Completed supine<>sit Joleen to L LE, sit<>stand x2 Joleen in order to gain balance.  Sitting /88, completed sit<>stand able to stand for 45 sec and take lateral steps toward HOB.  Patient on 2L when entering room, weaned to 1L while doing therapy O2 91-93%, returned to 2L at end of session.   Barriers to return to prior living situation: bed mobility, transfers, precautions, pain  Recommendations for discharge: TCU  Rationale for recommendations: Patient requiring PT in order to maximize independence in order to PLOF, patient lives alone.         Entered by: Kenya Linder 05/01/2019 5:19 PM

## 2019-05-01 NOTE — PLAN OF CARE
Pt AxOx4. Pt arrived to unit at 1200 from PACU. Pt able to void spontaneously without difficulty in the urinal, PVR was 191 mL. Last BM 4/30/19, hypoactive bowel sounds, not passing gas yet, but denies nausea. Has been able to eat crackers will advance to regular diet. Lung sounds clear in all fields, no SOB, no Cp. L hip dressing is CDI. Denies any pain had IV dilaudid in PACU. CMS intact, denies numbness and tingling. PIV in R hand infusing. CAPNO on with 2L of O2. Has CPAP from home for overnight. Able to make needs known with call light. Will continue to monitor.

## 2019-05-01 NOTE — ANESTHESIA PREPROCEDURE EVALUATION
"Anesthesia Pre-Procedure Evaluation    Patient: Les Kraus   MRN:     [id number] Gender:   male   Age:    73 year old :      1945        Preoperative Diagnosis: Left Hip Osteoarthritis   Procedure(s):  Left Total Hip Arthroplasty     Past Medical History:   Diagnosis Date     Exercise induced anaphylaxis     After eating certain foods. No issues for several years. Allergist could not determine cause.     Hypertension      MEHUL (obstructive sleep apnea)       Past Surgical History:   Procedure Laterality Date     ARTHROPLASTY HIP Right 2016    Procedure: ARTHROPLASTY HIP;  Surgeon: Espinoza Zaragoza MD;  Location: [facility] OR     COLONOSCOPY       EXTRACTION(S) DENTAL      [place] Teeth Removal     HERNIA REPAIR                     PHYSICAL EXAM:   Mental Status/Neuro: A/A/O   Airway: Facies: Feasible  Mallampati: I  Mouth/Opening: Full  TM distance: > 6 cm  Neck ROM: Full   Respiratory: Auscultation: CTAB     Resp. Rate: Normal     Resp. Effort: Normal      CV: Rhythm: Regular  Rate: Age appropriate  Heart: Normal Sounds   Comments:      Dental: Normal                  Lab Results   Component Value Date    WBC 9.8 2016    HGB 12.3 (L) 2016    HCT 36.7 (L) 2016     2016     2016    POTASSIUM 3.6 2016    CHLORIDE 108 2016    CO2 28 2016    BUN 12 2016    CR 0.99 2016    GLC 98 2016    LOUIS 8.6 2016       Preop Vitals  BP Readings from Last 3 Encounters:   19 (!) 153/98   11/15/16 141/85   16 130/82    Pulse Readings from Last 3 Encounters:   11/15/16 68   16 77   16 87      Resp Readings from Last 3 Encounters:   19 24   16 16   14 14    SpO2 Readings from Last 3 Encounters:   19 95%   16 96%   14 96%      Temp Readings from Last 1 Encounters:   19 36.7  C (98.1  F) (Oral)    Ht Readings from Last 1 Encounters:   19 1.778 m (5' 10\")      Wt Readings from " "Last 1 Encounters:   04/04/19 104.3 kg (230 lb)    Estimated body mass index is 33 kg/m  as calculated from the following:    Height as of 4/4/19: 1.778 m (5' 10\").    Weight as of 4/4/19: 104.3 kg (230 lb).     LDA:  Peripheral IV 08/17/16 Right Hand (Active)   Number of days: 987       Peripheral IV 05/01/19 Right Hand (Active)   Number of days: 0       ETT (adult) (Active)   Number of days: 987            Assessment:   ASA SCORE: 2       Documentation: H&P complete; Preop Testing complete; Consents complete   Proceeding: Proceed without further delay  Tobacco Use:  NO Active use of Tobacco/UNKNOWN Tobacco use status     Plan:   Anes. Type:  General   Pre-Induction: Midazolam IV; Acetaminophen PO   Induction:  IV (Standard)   Airway: Oral ETT   Access/Monitoring: PIV   Maintenance: Balanced   Emergence: Procedure Site   Logistics: Same Day Surgery     Postop Pain/Sedation Strategy:  Standard-Options: Opioids PRN     PONV Management:  Adult Risk Factors:, Non-Smoker, Postop Opioids                         Jenaro Rodgers DO  "

## 2019-05-01 NOTE — ANESTHESIA CARE TRANSFER NOTE
Patient: Les Kraus    Procedure(s):  Left Total Hip Arthroplasty    Diagnosis: Left Hip Osteoarthritis  Diagnosis Additional Information: No value filed.    Anesthesia Type:   No value filed.     Note:  Airway :Face Mask  Patient transferred to:PACU  Comments: 140/79, HR 84, sat 98%, RR 13Handoff Report: Identifed the Patient, Identified the Reponsible Provider, Reviewed the pertinent medical history, Discussed the surgical course, Reviewed Intra-OP anesthesia mangement and issues during anesthesia, Set expectations for post-procedure period and Allowed opportunity for questions and acknowledgement of understanding      Vitals: (Last set prior to Anesthesia Care Transfer)    CRNA VITALS  5/1/2019 0954 - 5/1/2019 1035      5/1/2019             Pulse:  94    SpO2:  100 %    Resp Rate (observed):  16                Electronically Signed By: MELLY Asencio CRNA  May 1, 2019  10:35 AM

## 2019-05-01 NOTE — CONSULTS
[facility], [place]  Consult Note - Hospitalist Service       Date of Admission: 5/1/2019  Consult Requested by: Dr. Ruby  Reason for Consult: Loree-op Medical Management    Assessment & Plan   Les Kraus is a 73 year old male with a history of HTN, MEHUL, anaphylaxis, and OA admitted to [facility] on 5/1/19 following left total hip arthoplasty for osteoarthritis.    Left Hip Osteoarthritis s/p Left MARC on 5/1/19 - By Dr. Zaragoza.  mL. Pre-op Hgb 15.5 and Cr 1.05 on 4/23. Receiving loree-op Ancef. Last BM 4/30. Pain controlled.  - Management per Orthopedics.  - Pain Control: Scheduled Tylenol, Lidocaine/Icy Hot patches, Robaxin PRN, Oxycodone 5-10 mg q 4h PRN, IV Dilaudid PRN for breakthrough pain.  - DVT Prophylaxis: Lovenox x 2 weeks, followed by Aspirin x 2 weeks per Ortho  - Scheduled bowel medications while on opiates  - Monitor PVRs given hx of urinary retention following right MARC. Straight cath PRN for PVR >300 mL. Low threshold for Flomax initiation.  - IVF at 100 mL/hr overnight due to hx of HERNANDEZ following right MARC in 2016.   - Trend BMP, Mg, and Hgb on 5/2     Hx of Anaphylaxis - Occurred ~10-15 years ago after eating pizza and drinking red wine then going on a long walk. Evaluated by allergist who could not determine cause. Has Epi pen which he has never used. No further occurrences, though typically exercises on an empty stomach.  - Epi pen available PRN     HTN - On Norvasc 5 mg daily PTA, did not take DOS. Outpatient BP typically 110-120/75-80. /94 currently.  - Resume PTA Norvasc with hold parameters on 5/2.    MEHUL - Stable.   - Continue home CPAP with sleep.    The patient's care was discussed with the Attending Physician, Dr. Morin.    Amanda Bain PA-C  Hospitalist Service  [phone]  ____________________________________________________________________    Chief Complaint   S/p Left Total Hip Arthroplasty    History is obtained from the  patient    History of Present Illness   Les Kraus is a 73 year old male with a history of HTN, MEHUL, anaphylaxis, and osteoarthritis who is admitted to [facility] following left total hip arthoplasty on 5/1/19 for osteoarthritis. He received general anesthesia with EBL of 150 mL. Post-operatively, he is doing well. He reports slight vertigo after eating this afternoon. Last BM 4/30. He has voided once since surgery without issue. Denies chest pain, SOB, n/v/c/d, abdominal pain, or dysuria currently.    Review of Systems   The 10 point Review of Systems is negative other than noted in the HPI or here.     Past Medical History    I have reviewed this patient's medical history and updated it with pertinent information if needed.   Past Medical History:   Diagnosis Date     Exercise induced anaphylaxis     After eating certain foods. No issues for several years. Allergist could not determine cause.     Hypertension      MEHUL (obstructive sleep apnea)     On CPAP     Osteoarthritis        Past Surgical History   I have reviewed this patient's surgical history and updated it with pertinent information if needed.  Past Surgical History:   Procedure Laterality Date     ARTHROPLASTY HIP Right 8/17/2016    Procedure: ARTHROPLASTY HIP;  Surgeon: Espinoza Zaragoza MD;  Location: [facility] OR     C TOTAL HIP ARTHROPLASTY Left 05/01/2019     COLONOSCOPY       EXTRACTION(S) DENTAL      [place] Teeth Removal     HERNIA REPAIR         Social History   I have reviewed this patient's social history and updated it with pertinent information if needed.  Social History     Tobacco Use     Smoking status: Never Smoker     Smokeless tobacco: Never Used   Substance Use Topics     Alcohol use: Yes     Comment: 1 glass of red wine per night     Drug use: No       Family History   I have reviewed this patient's family history and updated it with pertinent information if needed.   Family History   Problem Relation Age of Onset     Alzheimer  Disease Mother      Coronary Artery Disease Father        Medications   Medications Prior to Admission   Medication Sig Dispense Refill Last Dose     amLODIPine (NORVASC) 5 MG tablet    4/30/2019 at 0800     Multiple Vitamins-Minerals (MULTIVITAMIN & MINERAL PO)    4/29/2019     amoxicillin (AMOXIL) 500 MG tablet Take 4 tablets (2 g) 1 hour before dental procedure/cleaning. (Patient not taking: Reported on 4/4/2019) 4 tablet 3 Not Taking     EPIPEN 2-ALEXX 0.3 MG/0.3ML injection    Not Taking       Allergies   No Known Allergies    Physical Exam   Vital Signs: Temp: 96.4  F (35.8  C) Temp src: Axillary BP: 142/79 Pulse: 82 Heart Rate: 82 Resp: 16 SpO2: 92 % O2 Device: Nasal cannula Oxygen Delivery: 2 LPM    General: Awake. Non-toxic appearing. NAD.  HEENT: NC/AT. Anicteric sclera. Mucous membranes moist.  CV: RRR. No appreciable murmurs.  Respiratory: Normal effort on 2L via NC. Lungs CTA anterolaterally. No wheezes, rales, or rhonchi.  GI: Soft, non-tender, and non-distended with bowel sounds present.  Extremities: No peripheral edema. Warm and well perfused. Further exam per Orthopedics.  Neuro: A&O x 3. Grossly non-focal.  Skin: Hip incision covered with dressing which is c/d/i. No rashes or jaundice.    Data   Results for orders placed or performed during the hospital encounter of 05/01/19 (from the past 24 hour(s))   UA with Microscopic reflex to Culture   Result Value Ref Range    Color Urine Yellow     Appearance Urine Clear     Glucose Urine Negative NEG^Negative mg/dL    Bilirubin Urine Negative NEG^Negative    Ketones Urine Negative NEG^Negative mg/dL    Specific Gravity Urine 1.017 1.003 - 1.035    Blood Urine Negative NEG^Negative    pH Urine 5.5 5.0 - 7.0 pH    Protein Albumin Urine 10 (A) NEG^Negative mg/dL    Urobilinogen mg/dL Normal 0.0 - 2.0 mg/dL    Nitrite Urine Negative NEG^Negative    Leukocyte Esterase Urine Negative NEG^Negative    Source Midstream Urine     WBC Urine 1 0 - 5 /HPF    RBC Urine  2 0 - 2 /HPF    Mucous Urine Present (A) NEG^Negative /LPF   ABO/Rh type and screen   Result Value Ref Range    ABO A     RH(D) Pos     Antibody Screen Neg     Test Valid Only At          [facility]    Specimen Expires 05/04/2019    Glucose by meter   Result Value Ref Range    Glucose 95 70 - 99 mg/dL   XR Pelvis w Hip Port Left G/E 2 Views    Narrative    XR PELVIS AND HIP PORTABLE LEFT 2 VIEWS  5/1/2019 11:50 AM    History:  Status post MARC.     Comparison: Pelvis radiograph dated 4/4/2019    Findings: AP view of the pelvis, cross table view of the left hip  radiograph. New postoperative changes of left total hip arthroplasty.  Hardware appears intact. No new osseous abnormality. Alignment is  within normal limits. Subcutaneous emphysema overlying left greater  trochanter.      Impression    IMPRESSION: Postoperative changes of bilateral total hip arthroplasty,  new in the left hip without evidence of complication.    I have personally reviewed the examination and initial interpretation  and I agree with the findings.    SHIRA DILLARD MD

## 2019-05-01 NOTE — BRIEF OP NOTE
Orthopaedic Surgery Brief Op-Note      Patient: Les Kraus  : 1945  Date of Service: 2019 10:32 AM    Pre-operative Diagnosis: Left Hip Osteoarthritis  Post-operative Diagnosis: same    Procedure(s) Performed: Procedure(s):  Left Total Hip Arthroplasty    Staff: Dr. Zaragoza  Assistants:   Gregory Ruby MD    Anesthesia: General  EBL: 150 cc    Implants:   Implant Name Type Inv. Item Serial No.  Lot No. LRB No. Used   IMP LINER SNR ACET R3 XLPE 0DEG [id number] Total Joint Component/Insert IMP LINER SNR ACET R3 XLPE 0DEG [id number]  [employer]  [id number] Left 1   IMP SHELL SNR ACET R3 3H 56MM [id number] Total Joint Component/Insert IMP SHELL SNR ACET R3 3H 56MM [id number]  [employer]  [id number] Left 1   IMP SCR ACET SNN SPHERICAL HEAD 6.5X40MM [id number] Metallic Hardware/[place] IMP SCR ACET SNN SPHERICAL HEAD 6.5X40MM [id number]  [employer]  [id number] Left 1   IMP SCR ACET SNN SPHERICAL HEAD 6.5X25MM [id number] Metallic Hardware/[place] IMP SCR ACET SNN SPHERICAL HEAD 6.5X25MM [id number]  [employer]  [id number] Left 1   IMP HEAD FEMORAL SNN BIPOLAR [place] 36MM +8 [id number] Total Joint Component/Insert IMP HEAD FEMORAL SNN BIPOLAR [place] 36MM +8 [id number]  [employer]  [id number] Left 1   IMP STEM FEM HIP SNN SYNERGY POROUS SZ 12 HO [id number] Total Joint Component/Insert IMP STEM FEM HIP SNN SYNERGY POROUS SZ 12 HO [id number]  [employer]  [id number] Left 1     Drains: none  Intra-op Labs/Cxs/Specimens: None  Complications: No apparent complications during procedure  Findings: Please see dictated operative note for details    Disposition: Stable to PACU, then admit to Orthopaedics    POST OPERATIVE PLAN    Assessment/Plan: Les Kraus is a 73 year old male s/p Procedure(s):  Left Total Hip Arthroplasty on 2019 with Dr. Zaragoza.    Activity: Up with assist and assistive devices as needed until independent. Posterior hip precautions to operative side x 3 months:  1) No hip flexion beyond 90 degrees  2) No adduction  beyond midline  3) No internal rotation beyond neutral   Weight bearing status: WBAT  Antibiotics: Cefazolin x 24 hours  Diet: Begin with clear fluids and progress diet as tolerated. Bowel regimen. Anti-emetics PRN.    DVT prophylaxis: Mechanical while in hospital with Lovenox x 2 weeks, followed by aspirin x 2 weeks  Elevation: Elevate heels off of bed on pillows   Wound Care: Aquacel x 5-7 days.    Drains: none  Pain management: Orals PRN, IV for breakthrough only  X-rays: AP Pelvis and Lateral operative hip in PACU.   Physical Therapy: Mobilization, ROM, ADL's, Posterior hip precautions.    Occupational Therapy: ADL's  Labs: Trend Hgb on POD #1, 2  Consults: PT, OT. Hospitalist, appreciate assistance in caring for this patient throughout the perioperative period    Future Appointments   Date Time Provider Department [place]   5/2/2019  7:30 AM Tamiko Hancock, PIPPA [facility] [place]   5/2/2019  8:30 AM Sloane Hansen Pt, PT [facility] [place]   5/2/2019  2:30 PM Mayelin Bloom, PT [facility] [place]   5/24/2019  1:00 PM Espinoza Granger PA-C [facility]   6/13/2019  1:00 PM Espinoza Zaragoza MD [facility]       Disposition: Pending progress with therapies, pain control on orals, and medical stability, anticipate discharge to Home vs TCU on POD #1-2.    Gregory Ruby  Orthopaedic PGY4  Pager: [phone]

## 2019-05-01 NOTE — OR NURSING
PACU to Inpatient Nursing Handoff    Patient Les Kraus is a 73 year old male who speaks English.   Procedure Procedure(s):  Left Total Hip Arthroplasty   Surgeon(s) Primary: Espinoza Zaragoza MD  Resident - Assisting: Gregory Ruby MD     No Known Allergies    Isolation  [unfilled]     Past Medical History   has a past medical history of Exercise induced anaphylaxis, Hypertension, and MEHUL (obstructive sleep apnea).    Anesthesia General   Dermatome Level     Preop Meds acetaminophen (Tylenol) - time given: 0626  celecoxib (Celebrex) - time given: 0626  gabapentin (Neurontin) - time given: 0626   Nerve block Not applicable   Intraop Meds dexamethasone (Decadron)  ketorolac (Toradol): last given at 0941  ondansetron (Zofran): last given at 0934   Local Meds Yes - Local Cocktail (morphine, ropivacaine, epinephrine, Toradol)   Antibiotics cefazolin (Ancef) - last given at 0930     Pain Patient Currently in Pain: yes  Comfort: tolerable with discomfort  Pain Control: partially effective   PACU meds  hydromorphone (Dilaudid): 0.5 mg (total dose) last given at 1110    PCA / epidural No   Capnography  yes   Telemetry ECG Rhythm: Sinus rhythm   Inpatient Telemetry Monitor Ordered? No        Labs Glucose Lab Results   Component Value Date    GLC 98 08/20/2016       Hgb Lab Results   Component Value Date    HGB 12.3 08/20/2016       INR No results found for: INR   PACU Imaging Completed     Wound/Incision Incision/Surgical Site 08/17/16 Right Hip (Active)   Number of days: 987       Incision/Surgical Site 05/01/19 Left Hip (Active)   Incision Assessment UTV 5/1/2019 11:13 AM   Closure Adhesive strip(s);Liquid bandage 5/1/2019 10:10 AM   Dressing Intervention Clean, dry, intact 5/1/2019 11:13 AM   Number of days: 0      CMS        Equipment ice pack abductor Pillow   Other LDA       IV Access Peripheral IV 08/17/16 Right Hand (Active)   Number of days: 987       Peripheral IV 05/01/19 Right Hand (Active)   Site  Assessment WDL 5/1/2019 11:13 AM   Line Status Infusing 5/1/2019 10:39 AM   Phlebitis Scale 0-->no symptoms 5/1/2019 10:39 AM   Infiltration Scale 0 5/1/2019 10:39 AM   Number of days: 0      Blood Products Not applicable  mL   Intake/Output Date 05/01/19 0700 - 05/02/19 0659   Shift 6839-0890 7134-2017 7921-2852 24 Hour Total   INTAKE   P.O. 200   200   I.V. 900   900   Shift Total 1100   1100   OUTPUT   Blood 225   225   Shift Total 225   225   Weight (kg)          Drains / Jones     Time of void PreOp Void Prior to Procedure: 0600 (05/01/19 0638)    PostOp      Diapered? No   Bladder Scan      mL (05/01/19 1113)  tolerating sips     Vitals    B/P: 129/72  T: 98.6  F (37  C)    Temp src: Axillary  P:  Pulse: 78 (05/01/19 1100)    Heart Rate: 76 (05/01/19 1100)     R: 16  O2:  SpO2: 95 %    O2 Device: Nasal cannula (05/01/19 1100)    Oxygen Delivery: 2 LPM (05/01/19 1100)         Family/support present Cousin   Patient belongings     Patient transported on bed   DC meds/scripts (obs/outpt) Not applicable   Inpatient Pain Meds Released? Yes       Special needs/considerations IM consult, very pleasant   Tasks needing completion has hugo Vergara, RN  ASCOM [id number]

## 2019-05-01 NOTE — OP NOTE
OPERATIVE REPORT    DATE OF SERVICE: 5/1/19    SURGEON: Espinoza Zaragoza MD.    ASSISTANT(S):  Kendall Ruby MD    PREOPERATIVE DIAGNOSIS:  Osteoarthritis    POSTOPERATIVE DIAGNOSIS:  Osteoarthritis    OPERATION PERFORMED:  Left total hip arthroplasty    IMPLANTS:    Implant Name Type Inv. Item Serial No.  Lot No. LRB No. Used   IMP LINER SNR ACET R3 XLPE 0DEG [id number] Total Joint Component/Insert IMP LINER SNR ACET R3 XLPE 0DEG [id number]  [place]  [id number] Left 1   IMP SHELL SNR ACET R3 3H 56MM [id number] Total Joint Component/Insert IMP SHELL SNR ACET R3 3H 56MM [id number]  [place]  [id number] Left 1   IMP SCR ACET SNN SPHERICAL HEAD 6.5X40MM [id number] Metallic Hardware/[employer] IMP SCR ACET SNN SPHERICAL HEAD 6.5X40MM [id number]  [place]  [id number] Left 1   IMP SCR ACET SNN SPHERICAL HEAD 6.5X25MM [id number] Metallic Hardware/[place] IMP SCR ACET SNN SPHERICAL HEAD 6.5X25MM [id number]  [place]  [id number] Left 1   IMP HEAD FEMORAL SNN BIPOLAR [place] 36MM +8 [id number] Total Joint Component/Insert IMP HEAD FEMORAL SNN BIPOLAR [place] 36MM +8 [id number]  [place]  [id number] Left 1   IMP STEM FEM HIP SNN SYNERGY POROUS SZ 12 HO [id number] Total Joint Component/Insert IMP STEM FEM HIP SNN SYNERGY POROUS SZ 12 HO [id number]  [employer]  [id number] Left 1       ANESTHETIC: General     OPERATIVE FINDINGS:  End stage arthrosis of the hip    BLOOD LOSS:    COMPLICATIONS:  None apparent    OPERATIVE INDICATIONS:  The patient has a long history of debilitating pain secondary to ostearthritis of the hip.  Despite comprehensive non-operative management these symptoms continued to interfere with activities of daily living.  After discussion of further treatment options including the risks and benefits that patient elected to proceed with a total hip.    DESCRIPTION OF THE PROCEDURE:  The patient was identified in the preoperative holding area.  The consent form including the risks and benefits were reviewed with the patient.  The  operative limb was identified and marked.  The patient was brought back to the operating room and placed supine on the operating table.  An anesthetic was induced by the anesthesia team.   The patient was placed in the lateral decubitus position and prepped and draped in the normal standard fashion for a hip replacement.  A time-out was called.  Antibiotics were given.  We utilized an approximately 15 cm curvilinear incision, centered on the vastus ridge, and performed a standard posterior approach to the hip.  The tensor fascia was split.  A small portion of gluteus keily was split in line with its fibers.  The sciatic nerve was palpated.  The east-west retractor was placed.  The posterior border of gluteus medius was exposed and retracted.  The tendon of piriformis and that of the obturators was released from their attachments.  A trapdoor posterior capsulotomy was performed.  The hip was dislocated.  The lesser trochanter was exposed.  A ruler was used to measure and electrocautery was used to liza our neck cut as preoperatively templated.  The head was measured with a caliper and found to be 52 mm.  This measurement was used to choose our first reamer.  The neck cut was re-measured. The femur was elevated.  A Hohmann was placed over the anterior rim of the acetabulum and the femur was subluxed anterior.  A split was made in the inferior capsule.  The transverse acetabular ligament was left intact and used a guide for the anterversion of the acetabular component.  Circumferential retractors were placed.  We began reaming and went up by two until sufficient contact was made with the acetabular rim.  We then went up by one millimeter for a one millimeter press-fit.  We were within one size of our preoperative plan.   A trail was placed.  It had an excellent press fit.  We then placed out final component in 40 degrees of inclination and approximately 20 degrees of anteversion, parallel to the transverse ligament.   "The press fit was excellent.  Screws were placed for additional initial fixation.  A flat liner was then placed. It locked into place.  Attention was turned to the femur.  Retractors were placed to elevated the proximal femur and to protect the tendon of gluteus medius.  Remaining lateral neck was removed and the piriformis fossa was cleared of soft-tissue.  A box osteotome and canal finder were used to prepare for broaching.  A sharp broach was used to lateralize slightly.  We then broached up sequentially to a size 12.  It was rotationally stable and sat up 1-2 millimeters from the neck-cut.  Preoperatively the patient had templated to a high offset stem.  The high offset stem appropriately tensioned the abductors.  We trialed the following fmoeral heads: 0, 4, and +8.  The +8 most appropriately tensioned the abductors and clinically equalized the leg-lengths.  The stability exam was excellent.  The hip was stable and there was no impingement posteriorly with hyper-extension and maximal external rotation.  With full extension, the knee could be fixed to bring the foot nearly to the buttock.  With the hip in ninety degrees of flexion and neutral rotation there was greater than 60 degrees of internal rotation before subluxation.  There appropriate movement with a \"mar\" test.  Happy with our stability exam, the final implant was placed in approximately twenty degrees of anteversion.  It sat within 1 mm of the broach.  We then trailed with a +8 head.  The stability exam was identical.  We then placed the final head on a clean, dry neck and impacted it into place. The hip was reduced after directly visualizing the entire acetabulum.  The wound was then irrigated.  The posterior capsule and short external rotators were sutured to the greater trochanter with non-absorbable suture through bone tunnels.The fascia was closed with interrupted Vicryl, the dermis with interrupted Vicryl, and skin with running monocryl, " Dermabond and steri-strips.  At the end of the procedure the sponge and needle counts were correct times two.  The patient tolerated the procedure well and returned to the PAR extubated and stable.    POSTOPERATIVE PLAN:  1. Weight bearing as tolerated  2. Standard posterior hip precautions  3. DVT prophylaxis   4. 24 hours of prophylactic antibiotics  5. Follow-up:  Wound clinic in 2 weeks and with Nik in clinic in 6 weeks for x-rays and a rehabilitation check.

## 2019-05-01 NOTE — ANESTHESIA POSTPROCEDURE EVALUATION
Anesthesia POST Procedure Evaluation    Patient: Les Kraus   MRN:     [id number] Gender:   male   Age:    73 year old :      1945        Preoperative Diagnosis: Left Hip Osteoarthritis   Procedure(s):  Left Total Hip Arthroplasty   Postop Comments: No value filed.       Anesthesia Type:  General  No value filed.    Reportable Event: NO     PAIN: Uncomplicated   Sign Out status: Comfortable, Well controlled pain     PONV: No PONV   Sign Out status:  No Nausea or Vomiting     Neuro/Psych: Uneventful perioperative course   Sign Out Status: Preoperative baseline; Age appropriate mentation     Airway/Resp.: Uneventful perioperative course   Sign Out Status: Non labored breathing, age appropriate RR; Resp. Status within EXPECTED Parameters     CV: Uneventful perioperative course   Sign Out status: Appropriate BP and perfusion indices; Appropriate HR/Rhythm     Disposition:   Sign Out in:  PACU  Disposition:  Phase II; Home  Recovery Course: Uneventful  Follow-Up: Not required           Last Anesthesia Record Vitals:  CRNA VITALS  2019 0954 - 2019 1054      2019             Pulse:  94    SpO2:  100 %    Resp Rate (observed):  16          Last PACU Vitals:  Vitals Value Taken Time   /78 2019 11:45 AM   Temp 37  C (98.6  F) 2019 11:00 AM   Pulse 81 2019 11:45 AM   Resp 13 2019 11:52 AM   SpO2 98 % 2019 11:52 AM   Temp src     NIBP     Pulse     SpO2     Resp     Temp     Ht Rate     Temp 2     Vitals shown include unvalidated device data.      Electronically Signed By: Jenaro Rodgers DO, May 1, 2019, 11:53 AM

## 2019-05-01 NOTE — PROGRESS NOTES
05/01/19 1600   Quick Adds   Type of Visit Initial PT Evaluation       Present no   Language English   Living Environment   Lives With alone   Living Arrangements house   Home Accessibility stairs to enter home;stairs within home   Number of Stairs, Main Entrance 5   Stair Railings, Main Entrance railings on both sides of stairs  (cant reach both at the same time)   Number of Stairs, Within Home, Primary other (see comments)  (14, in order to get to basement for laundry)   Stair Railings, Within Home, Primary railing on left side (ascending)   Transportation Anticipated agency   Self-Care   Usual Activity Tolerance moderate   Current Activity Tolerance moderate   Regular Exercise Yes   Activity/Exercise Type walking   Exercise Amount/Frequency 3-5 times/wk   Equipment Currently Used at Home cane, straight;grab bar, tub/shower   Functional Level Prior   Ambulation 0-->independent   Transferring 0-->independent   Toileting 0-->independent   Bathing 0-->independent   Communication 0-->understands/communicates without difficulty   Swallowing 0-->swallows foods/liquids without difficulty   Cognition 0 - no cognition issues reported   Fall history within last six months no   General Information   Onset of Illness/Injury or Date of Surgery - Date 05/01/19   Referring Physician  Espinoza Zaragoza MD.   Patient/Family Goals Statement to return home   Pertinent History of Current Problem (include personal factors and/or comorbidities that impact the POC) s/p L MARC   Precautions/Limitations left hip precautions;fall precautions   Weight-Bearing Status - LUE full weight-bearing   Weight-Bearing Status - RUE full weight-bearing   Weight-Bearing Status - LLE weight-bearing as tolerated   Weight-Bearing Status - RLE full weight-bearing   General Observations Capnography, IV, abductor pillow, PCD   Cognitive Status Examination   Orientation orientation to person, place and time   Level of Consciousness  "alert   Follows Commands and Answers Questions 100% of the time   Personal Safety and Judgment intact   Memory intact   Pain Assessment   Patient Currently in Pain Yes, see Vital Sign flowsheet   Integumentary/Edema   Integumentary/Edema Comments post operative swelling present, incision not directly seen by PT   Posture    Posture Forward head position;Protracted shoulders   Range of Motion (ROM)   ROM Comment not formally assessed, within functional limits within precautions   Strength   Strength Comments not formally assessed, within functional limits for sit<>stand   Bed Mobility   Bed Mobility Comments supine<>sit Joleen for L LE   Transfer Skills   Transfer Comments sit<>stand Joleen of 1   Gait   Gait Comments Able to take lateral steps at bed side   Balance   Balance Comments good sitting balance, fair standing balance due to pain   Sensory Examination   Sensory Perception Comments no abnormal findings present   General Therapy Interventions   Planned Therapy Interventions bed mobility training;gait training;ROM;strengthening;stretching;transfer training;home program guidelines   Clinical Impression   Criteria for Skilled Therapeutic Intervention yes, treatment indicated   PT Diagnosis impaired functional mobility   Influenced by the following impairments strength, ROM, pain, precautions   Functional limitations due to impairments gait, transfers, bed mobility, stairs   Clinical Presentation Stable/Uncomplicated   Clinical Presentation Rationale s/p MARC without complications   Clinical Decision Making (Complexity) Low complexity   Therapy Frequency` 2 times/day   Predicted Duration of Therapy Intervention (days/wks) 3   Anticipated Discharge Disposition Transitional Care Facility   Risk & Benefits of therapy have been explained Yes   Patient, Family & other staff in agreement with plan of care Yes   [facility] AM-PAC TM \"6 Clicks\"   2016, Trustees of [facility], under license to [employer].  All " "rights reserved.   6 Clicks Short Forms Basic Mobility Inpatient Short Form   [facility] AM-PAC  \"6 Clicks\" V.2 Basic Mobility Inpatient Short Form   1. Turning from your back to your side while in a flat bed without using bedrails? 4 - None   2. Moving from lying on your back to sitting on the side of a flat bed without using bedrails? 3 - A Little   3. Moving to and from a bed to a chair (including a wheelchair)? 3 - A Little   4. Standing up from a chair using your arms (e.g., wheelchair, or bedside chair)? 3 - A Little   5. To walk in hospital room? 3 - A Little   6. Climbing 3-5 steps with a railing? 2 - A Lot   Basic Mobility Raw Score (Score out of 24.Lower scores equate to lower levels of function) 18   Total Evaluation Time   Total Evaluation Time (Minutes) 7     "

## 2019-05-02 ENCOUNTER — APPOINTMENT (OUTPATIENT)
Dept: PHYSICAL THERAPY | Facility: CLINIC | Age: 74
DRG: 470 | End: 2019-05-02
Attending: ORTHOPAEDIC SURGERY
Payer: MEDICARE

## 2019-05-02 ENCOUNTER — APPOINTMENT (OUTPATIENT)
Dept: OCCUPATIONAL THERAPY | Facility: CLINIC | Age: 74
DRG: 470 | End: 2019-05-02
Attending: ORTHOPAEDIC SURGERY
Payer: MEDICARE

## 2019-05-02 LAB
ALBUMIN UR-MCNC: NEGATIVE MG/DL
ANION GAP SERPL CALCULATED.3IONS-SCNC: 6 MMOL/L (ref 3–14)
APPEARANCE UR: CLEAR
BILIRUB UR QL STRIP: NEGATIVE
BUN SERPL-MCNC: 25 MG/DL (ref 7–30)
CALCIUM SERPL-MCNC: 8.5 MG/DL (ref 8.5–10.1)
CHLORIDE SERPL-SCNC: 108 MMOL/L (ref 94–109)
CO2 SERPL-SCNC: 27 MMOL/L (ref 20–32)
COLOR UR AUTO: ABNORMAL
CREAT SERPL-MCNC: 1.2 MG/DL (ref 0.66–1.25)
GFR SERPL CREATININE-BSD FRML MDRD: 59 ML/MIN/{1.73_M2}
GLUCOSE BLDC GLUCOMTR-MCNC: 117 MG/DL (ref 70–99)
GLUCOSE SERPL-MCNC: 112 MG/DL (ref 70–99)
GLUCOSE UR STRIP-MCNC: NEGATIVE MG/DL
HGB BLD-MCNC: 12.8 G/DL (ref 13.3–17.7)
HGB UR QL STRIP: NEGATIVE
KETONES UR STRIP-MCNC: NEGATIVE MG/DL
LEUKOCYTE ESTERASE UR QL STRIP: NEGATIVE
MAGNESIUM SERPL-MCNC: 2.3 MG/DL (ref 1.6–2.3)
NITRATE UR QL: NEGATIVE
PH UR STRIP: 6 PH (ref 5–7)
POTASSIUM SERPL-SCNC: 3.8 MMOL/L (ref 3.4–5.3)
RBC #/AREA URNS AUTO: 1 /HPF (ref 0–2)
SODIUM SERPL-SCNC: 141 MMOL/L (ref 133–144)
SOURCE: ABNORMAL
SP GR UR STRIP: 1.01 (ref 1–1.03)
SPERM #/AREA URNS HPF: PRESENT /HPF
UROBILINOGEN UR STRIP-MCNC: NORMAL MG/DL (ref 0–2)
WBC #/AREA URNS AUTO: <1 /HPF (ref 0–5)

## 2019-05-02 PROCEDURE — 97116 GAIT TRAINING THERAPY: CPT | Mod: GP | Performed by: PHYSICAL THERAPIST

## 2019-05-02 PROCEDURE — 36415 COLL VENOUS BLD VENIPUNCTURE: CPT | Performed by: ORTHOPAEDIC SURGERY

## 2019-05-02 PROCEDURE — 81001 URINALYSIS AUTO W/SCOPE: CPT | Performed by: INTERNAL MEDICINE

## 2019-05-02 PROCEDURE — 97165 OT EVAL LOW COMPLEX 30 MIN: CPT | Mod: GO | Performed by: OCCUPATIONAL THERAPIST

## 2019-05-02 PROCEDURE — 97110 THERAPEUTIC EXERCISES: CPT | Mod: GP | Performed by: PHYSICAL THERAPIST

## 2019-05-02 PROCEDURE — A9270 NON-COVERED ITEM OR SERVICE: HCPCS | Performed by: STUDENT IN AN ORGANIZED HEALTH CARE EDUCATION/TRAINING PROGRAM

## 2019-05-02 PROCEDURE — 12000001 ZZH R&B MED SURG/OB UMMC

## 2019-05-02 PROCEDURE — A9270 NON-COVERED ITEM OR SERVICE: HCPCS | Performed by: PHYSICIAN ASSISTANT

## 2019-05-02 PROCEDURE — 87086 URINE CULTURE/COLONY COUNT: CPT | Performed by: INTERNAL MEDICINE

## 2019-05-02 PROCEDURE — 99207 ZZC CDG-MDM COMPONENT: MEETS LOW - DOWN CODED: CPT | Performed by: INTERNAL MEDICINE

## 2019-05-02 PROCEDURE — 85018 HEMOGLOBIN: CPT | Performed by: ORTHOPAEDIC SURGERY

## 2019-05-02 PROCEDURE — 83735 ASSAY OF MAGNESIUM: CPT | Performed by: ORTHOPAEDIC SURGERY

## 2019-05-02 PROCEDURE — 97530 THERAPEUTIC ACTIVITIES: CPT | Mod: GO | Performed by: OCCUPATIONAL THERAPIST

## 2019-05-02 PROCEDURE — 25000132 ZZH RX MED GY IP 250 OP 250 PS 637: Performed by: INTERNAL MEDICINE

## 2019-05-02 PROCEDURE — 25000132 ZZH RX MED GY IP 250 OP 250 PS 637: Performed by: STUDENT IN AN ORGANIZED HEALTH CARE EDUCATION/TRAINING PROGRAM

## 2019-05-02 PROCEDURE — 00000146 ZZHCL STATISTIC GLUCOSE BY METER IP

## 2019-05-02 PROCEDURE — 80048 BASIC METABOLIC PNL TOTAL CA: CPT | Performed by: ORTHOPAEDIC SURGERY

## 2019-05-02 PROCEDURE — 25000132 ZZH RX MED GY IP 250 OP 250 PS 637: Performed by: PHYSICIAN ASSISTANT

## 2019-05-02 PROCEDURE — 25000128 H RX IP 250 OP 636: Performed by: STUDENT IN AN ORGANIZED HEALTH CARE EDUCATION/TRAINING PROGRAM

## 2019-05-02 PROCEDURE — 99232 SBSQ HOSP IP/OBS MODERATE 35: CPT | Performed by: INTERNAL MEDICINE

## 2019-05-02 PROCEDURE — 97535 SELF CARE MNGMENT TRAINING: CPT | Mod: GO | Performed by: OCCUPATIONAL THERAPIST

## 2019-05-02 RX ORDER — POLYETHYLENE GLYCOL 3350 17 G/17G
17 POWDER, FOR SOLUTION ORAL DAILY
Status: DISCONTINUED | OUTPATIENT
Start: 2019-05-02 | End: 2019-05-04 | Stop reason: HOSPADM

## 2019-05-02 RX ORDER — TAMSULOSIN HYDROCHLORIDE 0.4 MG/1
0.4 CAPSULE ORAL EVERY 24 HOURS
Status: DISCONTINUED | OUTPATIENT
Start: 2019-05-02 | End: 2019-05-04 | Stop reason: HOSPADM

## 2019-05-02 RX ORDER — AMLODIPINE BESYLATE 5 MG/1
5 TABLET ORAL DAILY
Status: DISCONTINUED | OUTPATIENT
Start: 2019-05-03 | End: 2019-05-02

## 2019-05-02 RX ADMIN — OXYCODONE HYDROCHLORIDE 5 MG: 5 TABLET ORAL at 08:32

## 2019-05-02 RX ADMIN — SENNOSIDES AND DOCUSATE SODIUM 2 TABLET: 8.6; 5 TABLET ORAL at 08:32

## 2019-05-02 RX ADMIN — AMLODIPINE BESYLATE 5 MG: 5 TABLET ORAL at 08:31

## 2019-05-02 RX ADMIN — OXYCODONE HYDROCHLORIDE 5 MG: 5 TABLET ORAL at 17:30

## 2019-05-02 RX ADMIN — ACETAMINOPHEN 975 MG: 325 TABLET, FILM COATED ORAL at 13:41

## 2019-05-02 RX ADMIN — ACETAMINOPHEN 975 MG: 325 TABLET, FILM COATED ORAL at 05:07

## 2019-05-02 RX ADMIN — MAGNESIUM HYDROXIDE 15 ML: 400 SUSPENSION ORAL at 08:32

## 2019-05-02 RX ADMIN — ENOXAPARIN SODIUM 40 MG: 40 INJECTION SUBCUTANEOUS at 05:07

## 2019-05-02 RX ADMIN — TAMSULOSIN HYDROCHLORIDE 0.4 MG: 0.4 CAPSULE ORAL at 18:34

## 2019-05-02 RX ADMIN — CEFAZOLIN SODIUM 2 G: 2 INJECTION, SOLUTION INTRAVENOUS at 02:04

## 2019-05-02 ASSESSMENT — ACTIVITIES OF DAILY LIVING (ADL)
ADLS_ACUITY_SCORE: 14
ADLS_ACUITY_SCORE: 12
ADLS_ACUITY_SCORE: 14
ADLS_ACUITY_SCORE: 14
ADLS_ACUITY_SCORE: 12
ADLS_ACUITY_SCORE: 12

## 2019-05-02 NOTE — PLAN OF CARE
VS: /81 (BP Location: Left arm)   Pulse 82   Temp 98.6  F (37  C) (Oral)   Resp 16   SpO2 95%      O2: O2 with CPAP; no O2 during day   Output: Voiding spontaneously   Last BM: 4/30/2019   Activity: 1 assist, WBAT   Up for meals? No    Skin: Warn, dry   Pain: denies   CMS: Denies numbness, tingling   Dressing: CDI   Diet: Regular   LDA: PIV infusing   Equipment: Abductor pillow, IV pole   Plan: TCU tomorrow?   Additional Info: Pt's biggest concern was the noise of alarms that were keeping him from sleeping.

## 2019-05-02 NOTE — CONSULTS
Social Work: Assessment with Discharge Plan    Patient Name:  Violette Kraus  :  1945  Age:  73 year old  MRN:  [id number]  Risk/Complexity Score:  Filed Complexity Screen Score: 1  Completed assessment with:  Pt, 8A IDT, Admissions [facility]  [phone]    Presenting Information   Reason for Referral:  Discharge plan  Date of Intake:  May 2, 2019  Referral Source:  Nurse and PT/OT  Decision Maker:  pt  Alternate Decision Maker:  Ned Maldonado, daughter Gretchen Kraus asNOK    Living Situation:  House  Previous Functional Status:  Independent  Patient and family understanding of hospitalization:  Seeking hip replacement surgery   Cultural/Language/Spiritual Considerations:  , male, lives alone  Adjustment to Illness:  Anticipating need for TCU    Physical Health  Reason for Admission:  Left Hip Osteoarthritis  Services Needed/Recommended:  TCU    Mental Health/Chemical Dependency  Diagnosis:  None noted  Support/Services in Place:  NA  Services Needed/Recommended:  NA    Support System  Significant relationship at present time:  Ned pt, dann Godinez  Family of origin is available for support:  yes  Other support available:  friends  Gaps in support system:  None noted  Patient is caregiver to:  None     Provider Information   Primary Care Physician:  Mariano Valdez   [phone]   Clinic:  [facility] [address] [place] [address] / [facility]      :      Financial   Income Source:  Pension, social security  Financial Concerns:  None noted  Insurance:    Payor/Plan Subscriber Name Rel Member # Group #   MEDICARE - MEDICARE VIOLETTE KRAUS  [id number]       ATTN CLAIMS, [address]   BCBS - BCBS OF VIOLETTE SERRANO  [id number]      [address] [address]       Discharge Plan   Patient and family discharge goal:  TCU-he has been to [facility] 3 years ago  Provided education on discharge plan:  YES  Patient agreeable to discharge plan:  YES  A list of  "Medicare Certified Facilities was provided to the patient and/or family to encourage patient choice. Patient's choices for facility are:  [facility]  Will NH provide Skilled rehabilitation or complex medical:  YES  General information regarding anticipated insurance coverage and possible out of pocket cost was discussed. Patient and patient's family are aware patient may incur the cost of transportation to the facility, pending insurance payment: YES-transportation  Barriers to discharge:  Medical stability, anticipate discharge Saturday, 5/4     Discharge Recommendations   Anticipated Disposition:  Facility:  [facility] [phone]  Transportation Needs:  Other:  pt anticipates need for WC Ride  Name of Transportation Company and Phone:  [facility] [phone]    Additional comments   SW introduced role/reason for visit. Pt was receptive and discussed his plan for going to TCU. He had TCU placement following his hip replacement surgery 3 years ago and expects to have similar experience with TCU placement followed by going home w/[facility].     Pt anticipates having WC ride similar to his previous discharge and is aware of private pay costs (estimated $70 for  , $5-6/mile).     Pt states he lives alone and did not have any assistance with chore services or homecare prior to hospitalization. He states he toured [facility] and thinks he is \"pre-registered\".     BENITO contacted admissions and made referral to [facility]. Await assessment response. BENITO con't to follow    "

## 2019-05-02 NOTE — PROGRESS NOTES
05/02/19 0730   Quick Adds   Type of Visit Initial Occupational Therapy Evaluation   Living Environment   Lives With alone   Living Arrangements house   Home Accessibility stairs to enter home   Number of Stairs, Main Entrance 5   Living Environment Comment Tub/shower, has grab bars and shower chair; has RTS   Self-Care   Usual Activity Tolerance moderate   Current Activity Tolerance moderate   Regular Exercise Yes   Exercise Amount/Frequency 3-5 times/wk   Activity/Exercise/Self-Care Comment Patient independent in self-cares/mobility without AE; reports has not been able to walk further distances for last 6 months.   Functional Level   Ambulation 0-->independent   Transferring 0-->independent   Toileting 0-->independent   Bathing 0-->independent   Dressing 0-->independent   Eating 0-->independent   Communication 0-->understands/communicates without difficulty   Swallowing 0-->swallows foods/liquids without difficulty   Cognition 0 - no cognition issues reported   Fall history within last six months no   Prior Functional Level Comment Was having difficulties with putting on socks.   General Information   Onset of Illness/Injury or Date of Surgery - Date 05/01/19   Referring Physician Dr. Zaragoza   Patient/Family Goals Statement return home to baseline   Additional Occupational Profile Info/Pertinent History of Current Problem POD #1 s/p L MARC   Precautions/Limitations left hip precautions   Weight-Bearing Status - LLE weight-bearing as tolerated   General Observations On RA, alert   Cognitive Status Examination   Cognitive Comment No cognitive concerns   Sensory Examination   Sensory Comments No N/T noted   Pain Assessment   Patient Currently in Pain Yes, see Vital Sign flowsheet   Range of Motion (ROM)   ROM Comment B UE AROM WFL   Mobility   Bed Mobility Bed mobility skill: Supine to sit   Bed Mobility Skill: Supine to Sit   Level of Gates: Supine/Sit contact guard   Assistive Device: Supine/Sit other  "(see comments)  (leg )   Transfer Skill: Bed to Chair/Chair to Bed   Level of Hitchcock: Bed to Chair contact guard   Assistive Device - Transfer Skill Bed to Chair Chair to Bed Rehab Eval standard walker   Transfer Skill: Sit to Stand   Level of Hitchcock: Sit/Stand contact guard   Assistive Device for Transfer: Sit/Stand standard walker   Toilet Transfer   Toilet Transfer Comments Did not assess   Upper Body Dressing   Level of Hitchcock: Dress Upper Body independent   Lower Body Dressing   Level of Hitchcock: Dress Lower Body stand-by assist   Assistive Device reacher;sock-aid   Toileting   Level of Hitchcock: Toilet stand-by assist   Grooming   Level of Hitchcock: Grooming independent   Eating/Self Feeding   Level of Hitchcock: Eating independent   Activities of Daily Living Analysis   Impairments Contributing to Impaired Activities of Daily Living pain;post surgical precautions;ROM decreased   General Therapy Interventions   Planned Therapy Interventions ADL retraining   Clinical Impression   Criteria for Skilled Therapeutic Interventions Met yes, treatment indicated   OT Diagnosis impaired self-cares/mobility   Influenced by the following impairments pain; decreased ROM   Assessment of Occupational Performance 1-3 Performance Deficits   Identified Performance Deficits dressing, bathing, toileting   Clinical Decision Making (Complexity) Low complexity   Therapy Frequency daily   Predicted Duration of Therapy Intervention (days/wks) 2-3 days   Anticipated Discharge Disposition Transitional Care Facility   Risks and Benefits of Treatment have been explained. Yes   Patient, Family & other staff in agreement with plan of care Yes   [facility] TM \"6 Clicks\"   2016, Trustees of [facility], under license to [employer].  All rights reserved.   6 Clicks Short Forms Daily Activity Inpatient Short Form   [facility]  \"6 Clicks\" Daily Activity Inpatient Short " Form   1. Putting on and taking off regular lower body clothing? 3 - A Little   2. Bathing (including washing, rinsing, drying)? 2 - A Lot   3. Toileting, which includes using toilet, bedpan or urinal? 3 - A Little   4. Putting on and taking off regular upper body clothing? 4 - None   5. Taking care of personal grooming such as brushing teeth? 4 - None   6. Eating meals? 4 - None   Daily Activity Raw Score (Score out of 24.Lower scores equate to lower levels of function) 20   Total Evaluation Time   Total Evaluation Time (Minutes) 8

## 2019-05-02 NOTE — PLAN OF CARE
Pt. A&Ox4. VSS. Afebrile. Lung sounds CTA. Maintaining sats w/ nasal cpap overnight. IS encouraged. Bowel sounds active, LBM 5/1 per pt report. PP+ DP+. CMS and neuro's are intact. Denies numbness and tingling in all extremities. Denies nausea, shortness of breath, and chest pain. Denies pain, declined prn pain medications. Voids spontaneously without difficulty on evening shift, pt states he feels he didn't have to go overnight. BS for 330mL, pt stated he didn't want to void yet and will when he gets up. Continue to offer toileting. Tolerating regular diet. L hip incisional dressing is CDI. Pt up with ax1 and walker. PIV is patent and infusing. PCD's on BLE's. Bilateral heels are elevated off the bed. Call light is within reach, pt able to make needs known and is resting comfortably between cares. Will continue to monitor.

## 2019-05-02 NOTE — PLAN OF CARE
Discharge Planner PT   Patient plan for discharge: TCU  Current status: Sit to/from standing from chair with FWW and CGA x 1.  Pt ambulated 100' with FWW and CGA x 1, distance limited by pain/fatigue.    Barriers to return to prior living situation: Level of assist, lives alone, and stairs.   Recommendations for discharge: TCU  Rationale for recommendations: Pt would benefit from further skilled PT for strengthening and increase independence with all functional mobility/gait.        Entered by: Sloane Hansen 05/02/2019 10:24 AM

## 2019-05-02 NOTE — PLAN OF CARE
VS: A&Ox4. VSS. Denies SOB, chest pain, dizziness, lightheadedness, numbness, tingling, nausea, and vomiting.    O2: >90% RA. Lung sounds clear bilaterally. IS encouraged. Pt uses CPAP at bedtime.    Output: Voiding spontaneously without difficulty in urinal/bathroom.    Last BM: Bowel sounds active, passing gas, and last BM 4/30.   Activity: A1 with walker, WBAT on LLE, on hip precautions.    Up for meals? Up for meals.    Skin: Intact with exception to incision on LLE.    Pain: Left hip pain. Has oxycodone 5-10 mg Q4H. Ice pack applied.    CMS: Intact.    Dressing: CDI.    Diet: Regular diet and tolerating well.    LDA: No PIV access.   Equipment: IV pole, walker, PCDs, personal belongings at bedside.    Plan: [facility] TCU 5/4.   Additional Info: Patient able to make needs known. Will continue to monitor.

## 2019-05-02 NOTE — PROGRESS NOTES
[facility] Internal Medicine Progress Note            Interval History:   Record reviewed.  Seen with RN.  S/P KIERA TRAN 5/1 Dr. Zaragoza.   ml's.  Adequate pain control with oxycodone 5 mg and tylenol.  Tolerating. Ambulated without LH.  No CP, SOB, cough.  O2 plus CPAP over night.  No nausea, reflux, abd pain or distention.  Last BM 2 days ago.  Slow stream.  PVR < 300.  Flomax last admit.           Medications:   All medications reviewed today          Physical Exam:   Blood pressure 123/68, pulse 69, temperature 96.6  F (35.9  C), temperature source Oral, resp. rate 14, SpO2 93 %.    Intake/Output Summary (Last 24 hours) at 5/2/2019 1534  Last data filed at 5/1/2019 1744  Gross per 24 hour   Intake --   Output 275 ml   Net -275 ml       General:  Alert.  Appropriate.  No distress.  Off O2.     Heent:      Neck:    Skin:    Chest:  clear    Cardiac:  Reg without gallop, murmur.  No JVD.     Abdomen:  Non distended, soft, non-tender.  BS normal.     Extremities:  Perfused.  No edema, calf, posterior thigh tenderness to suggest DVT.     Neuro:            Data:     Results for orders placed or performed during the hospital encounter of 05/01/19 (from the past 24 hour(s))   Platelet count   Result Value Ref Range    Platelet Count 221 150 - 450 10e9/L   Creatinine   Result Value Ref Range    Creatinine 1.12 0.66 - 1.25 mg/dL    GFR Estimate 64 >60 mL/min/[1.73_m2]    GFR Estimate If Black 74 >60 mL/min/[1.73_m2]   Hemoglobin   Result Value Ref Range    Hemoglobin 12.8 (L) 13.3 - 17.7 g/dL   Basic metabolic panel   Result Value Ref Range    Sodium 141 133 - 144 mmol/L    Potassium 3.8 3.4 - 5.3 mmol/L    Chloride 108 94 - 109 mmol/L    Carbon Dioxide 27 20 - 32 mmol/L    Anion Gap 6 3 - 14 mmol/L    Glucose 112 (H) 70 - 99 mg/dL    Urea Nitrogen 25 7 - 30 mg/dL    Creatinine 1.20 0.66 - 1.25 mg/dL    GFR Estimate 59 (L) >60 mL/min/[1.73_m2]    GFR Estimate If Black 68 >60 mL/min/[1.73_m2]    Calcium 8.5 8.5 -  10.1 mg/dL   Magnesium   Result Value Ref Range    Magnesium 2.3 1.6 - 2.3 mg/dL   Glucose by meter   Result Value Ref Range    Glucose 117 (H) 70 - 99 mg/dL                Assessment and Plan:   1)  S/P L MARC 5/1 Dr. Zaragoza.   ml's.  Mobilizing.  Adequate pain control.  2)  Acute blood loss anemia, mild.  3)  Hx of Anaphylaxis with exercise after po intake (etiology unclear).  Quiescent.  4)  HTN - adequate on norvasc with parameters.  5)  MEHUL on CPAP - hypoxemia contributed to by anesthesia and opiate related sedation with hypoventilation, atelectasis.  6)  Mild interval Cr rise (same issue last admit), possible effect from anesthesia, volume depletion, toradol.  6)  BPH with urine retention (not requiring velazquez).       PLAN:  1)  Review meds, orders.  2)  Adjust IV.  3)  IS, same opiates, bowel meds, lovenox, mobilize.  4)  Flomax.  5)  No toradol.  6)  Trend labs. Monitor clinically.   Disposition Plan   Expected discharge in 1-2 days to transitional care unit once mobilizing, adequate pain control, stable voiding pattern and labs. .     Entered: Alvaro Morin 05/02/2019, 3:34 PM              Attestation:  I have reviewed today's vital signs, notes, medications, labs and imaging.     Alvaro Morin MD

## 2019-05-02 NOTE — PLAN OF CARE
Discharge Planner OT   Patient plan for discharge: TCU  Current status: Patient alert and oriented, pleasant but anxious with movement.  Patient completed supine to sit with CGA with leg , CGA for transfer bed to chair with walker.  Educated in AE for LB dressing, able to dress LB with SBA with AE.  Barriers to return to prior living situation: Pain, weakness, limited mobility  Recommendations for discharge: TCU  Rationale for recommendations: Continued daily OT for maximum independence in ADLs/mobility       Entered by: Tamiko Hancock 05/02/2019 8:30 AM

## 2019-05-02 NOTE — PROGRESS NOTES
Orthopedic Surgery Progress Note    Subjective: NAEON. Feeling quite good. Pain well controlled. Denies new n/t, f, c, CP, SOB, N/V. States he is going to TCU.    Exam:  /67 (BP Location: Left arm)   Pulse 82   Temp 97.6  F (36.4  C) (Oral)   Resp 16   SpO2 92%   Gen: Awake, alert, NAD  Resp: breathing equal and non-labored  Extremities:  LLE: Aquacel CDI. 5/5 EHL/FHL/TA/Gsc. SILT in s/s/dp/sp/t distributions. 2+ DP and PT pulses. Toes WWP, good cap refill.      Labs:  Recent Labs   Lab 05/02/19 0527 05/01/19 2107   HGB 12.8*  --    PLT  --  221     Recent Labs   Lab 05/02/19 0527 05/01/19 2107     --    POTASSIUM 3.8  --    CHLORIDE 108  --    CO2 27  --    BUN 25  --    CR 1.20 1.12   *  --    MAG 2.3  --      No lab results found in last 7 days.    Imaging: Satisfactory positioning of MARC components. No new fxs/dislocations.    Assessment:   Les Kraus is a 73 year old male s/p Procedure(s):  Left Total Hip Arthroplasty on 5/1/2019 with Dr. Zaragoza.     Activity: Up with assist and assistive devices as needed until independent. Posterior hip precautions to operative side x 3 months:  1) No hip flexion beyond 90 degrees  2) No adduction beyond midline  3) No internal rotation beyond neutral   Weight bearing status: WBAT  Antibiotics: Cefazolin x 24 hours  Diet: Begin with clear fluids and progress diet as tolerated. Bowel regimen. Anti-emetics PRN.    DVT prophylaxis: Mechanical while in hospital with Lovenox x 2 weeks, followed by aspirin x 2 weeks  Elevation: Elevate heels off of bed on pillows   Wound Care: Aquacel x 5-7 days.    Drains: none  Pain management: Orals PRN, IV for breakthrough only  X-rays: AP Pelvis and Lateral operative hip in PACU.   Physical Therapy: Mobilization, ROM, ADL's, Posterior hip precautions.    Occupational Therapy: ADL's  Labs: Trend Hgb on POD #1, 2  Consults: PT, OT. Hospitalist, appreciate assistance in caring for this patient throughout the  perioperative period    -Disposition: Pending pain control, PT/OT, and medical clearance, anticipate that the patient will discharge to TCU in 1-2 days.    Future Appointments   Date Time Provider Department Center   5/2/2019  9:45 AM Sloane Hansen Pt, PT [facility]   5/2/2019  1:30 PM Sloane Hansen Pt, PT [facility]   5/24/2019  1:00 PM Espinoza Granger PA-C [facility]   6/13/2019  1:00 PM Espinoza Zaragoza MD [facility]        Gregory Argueta [facility]  Orthopaedic PGY4  [phone] (pager)

## 2019-05-03 ENCOUNTER — APPOINTMENT (OUTPATIENT)
Dept: OCCUPATIONAL THERAPY | Facility: CLINIC | Age: 74
DRG: 470 | End: 2019-05-03
Attending: ORTHOPAEDIC SURGERY
Payer: MEDICARE

## 2019-05-03 ENCOUNTER — APPOINTMENT (OUTPATIENT)
Dept: PHYSICAL THERAPY | Facility: CLINIC | Age: 74
DRG: 470 | End: 2019-05-03
Attending: ORTHOPAEDIC SURGERY
Payer: MEDICARE

## 2019-05-03 LAB
ANION GAP SERPL CALCULATED.3IONS-SCNC: 6 MMOL/L (ref 3–14)
BACTERIA SPEC CULT: NO GROWTH
BUN SERPL-MCNC: 20 MG/DL (ref 7–30)
CALCIUM SERPL-MCNC: 8.2 MG/DL (ref 8.5–10.1)
CHLORIDE SERPL-SCNC: 108 MMOL/L (ref 94–109)
CO2 SERPL-SCNC: 26 MMOL/L (ref 20–32)
CREAT SERPL-MCNC: 1.12 MG/DL (ref 0.66–1.25)
GFR SERPL CREATININE-BSD FRML MDRD: 64 ML/MIN/{1.73_M2}
GLUCOSE SERPL-MCNC: 105 MG/DL (ref 70–99)
HGB BLD-MCNC: 12.8 G/DL (ref 13.3–17.7)
Lab: NORMAL
POTASSIUM SERPL-SCNC: 3.9 MMOL/L (ref 3.4–5.3)
SODIUM SERPL-SCNC: 140 MMOL/L (ref 133–144)
SPECIMEN SOURCE: NORMAL

## 2019-05-03 PROCEDURE — 97530 THERAPEUTIC ACTIVITIES: CPT | Mod: GO

## 2019-05-03 PROCEDURE — 25000132 ZZH RX MED GY IP 250 OP 250 PS 637: Performed by: INTERNAL MEDICINE

## 2019-05-03 PROCEDURE — A9270 NON-COVERED ITEM OR SERVICE: HCPCS | Performed by: STUDENT IN AN ORGANIZED HEALTH CARE EDUCATION/TRAINING PROGRAM

## 2019-05-03 PROCEDURE — 97530 THERAPEUTIC ACTIVITIES: CPT | Mod: GP

## 2019-05-03 PROCEDURE — A9270 NON-COVERED ITEM OR SERVICE: HCPCS | Performed by: INTERNAL MEDICINE

## 2019-05-03 PROCEDURE — 97116 GAIT TRAINING THERAPY: CPT | Mod: GP

## 2019-05-03 PROCEDURE — 97110 THERAPEUTIC EXERCISES: CPT | Mod: GP

## 2019-05-03 PROCEDURE — 25000128 H RX IP 250 OP 636: Performed by: STUDENT IN AN ORGANIZED HEALTH CARE EDUCATION/TRAINING PROGRAM

## 2019-05-03 PROCEDURE — 99232 SBSQ HOSP IP/OBS MODERATE 35: CPT | Performed by: INTERNAL MEDICINE

## 2019-05-03 PROCEDURE — 80048 BASIC METABOLIC PNL TOTAL CA: CPT | Performed by: ORTHOPAEDIC SURGERY

## 2019-05-03 PROCEDURE — 97535 SELF CARE MNGMENT TRAINING: CPT | Mod: GO

## 2019-05-03 PROCEDURE — 36415 COLL VENOUS BLD VENIPUNCTURE: CPT | Performed by: ORTHOPAEDIC SURGERY

## 2019-05-03 PROCEDURE — 12000001 ZZH R&B MED SURG/OB UMMC

## 2019-05-03 PROCEDURE — 85018 HEMOGLOBIN: CPT | Performed by: ORTHOPAEDIC SURGERY

## 2019-05-03 PROCEDURE — 25000132 ZZH RX MED GY IP 250 OP 250 PS 637: Performed by: STUDENT IN AN ORGANIZED HEALTH CARE EDUCATION/TRAINING PROGRAM

## 2019-05-03 RX ORDER — BISACODYL 10 MG
10 SUPPOSITORY, RECTAL RECTAL DAILY PRN
Qty: 1 SUPPOSITORY | Refills: 0 | DISCHARGE
Start: 2019-05-03

## 2019-05-03 RX ORDER — ACETAMINOPHEN 325 MG/1
975 TABLET ORAL EVERY 8 HOURS
DISCHARGE
Start: 2019-05-03

## 2019-05-03 RX ORDER — BISACODYL 10 MG
10 SUPPOSITORY, RECTAL RECTAL ONCE
Status: COMPLETED | OUTPATIENT
Start: 2019-05-03 | End: 2019-05-03

## 2019-05-03 RX ORDER — OXYCODONE HYDROCHLORIDE 5 MG/1
5-10 TABLET ORAL EVERY 4 HOURS PRN
Qty: 40 TABLET | Refills: 0 | Status: SHIPPED | DISCHARGE
Start: 2019-05-03

## 2019-05-03 RX ORDER — METHOCARBAMOL 500 MG/1
500 TABLET, FILM COATED ORAL 4 TIMES DAILY PRN
DISCHARGE
Start: 2019-05-03

## 2019-05-03 RX ORDER — LIDOCAINE 4 G/G
1-3 PATCH TOPICAL EVERY 24 HOURS
DISCHARGE
Start: 2019-05-03

## 2019-05-03 RX ORDER — AMOXICILLIN 250 MG
1 CAPSULE ORAL 2 TIMES DAILY
DISCHARGE
Start: 2019-05-03

## 2019-05-03 RX ORDER — POLYETHYLENE GLYCOL 3350 17 G/17G
17 POWDER, FOR SOLUTION ORAL DAILY
DISCHARGE
Start: 2019-05-03

## 2019-05-03 RX ORDER — OXYCODONE HYDROCHLORIDE 5 MG/1
5-10 TABLET ORAL EVERY 4 HOURS PRN
Qty: 40 TABLET | Refills: 0 | Status: SHIPPED | OUTPATIENT
Start: 2019-05-03 | End: 2019-05-03

## 2019-05-03 RX ORDER — TAMSULOSIN HYDROCHLORIDE 0.4 MG/1
0.4 CAPSULE ORAL EVERY 24 HOURS
DISCHARGE
Start: 2019-05-03

## 2019-05-03 RX ORDER — ACETAMINOPHEN 325 MG/1
975 TABLET ORAL EVERY 8 HOURS
DISCHARGE
Start: 2019-05-03 | End: 2019-05-03

## 2019-05-03 RX ADMIN — BISACODYL 10 MG: 10 SUPPOSITORY RECTAL at 15:55

## 2019-05-03 RX ADMIN — ACETAMINOPHEN 975 MG: 325 TABLET, FILM COATED ORAL at 21:53

## 2019-05-03 RX ADMIN — ENOXAPARIN SODIUM 40 MG: 40 INJECTION SUBCUTANEOUS at 08:45

## 2019-05-03 RX ADMIN — SENNOSIDES AND DOCUSATE SODIUM 2 TABLET: 8.6; 5 TABLET ORAL at 08:44

## 2019-05-03 RX ADMIN — OXYCODONE HYDROCHLORIDE 5 MG: 5 TABLET ORAL at 15:52

## 2019-05-03 RX ADMIN — OXYCODONE HYDROCHLORIDE 5 MG: 5 TABLET ORAL at 02:35

## 2019-05-03 RX ADMIN — ACETAMINOPHEN 975 MG: 325 TABLET, FILM COATED ORAL at 07:21

## 2019-05-03 RX ADMIN — ACETAMINOPHEN 975 MG: 325 TABLET, FILM COATED ORAL at 15:02

## 2019-05-03 RX ADMIN — AMLODIPINE BESYLATE 5 MG: 5 TABLET ORAL at 08:45

## 2019-05-03 RX ADMIN — OXYCODONE HYDROCHLORIDE 5 MG: 5 TABLET ORAL at 08:45

## 2019-05-03 RX ADMIN — TAMSULOSIN HYDROCHLORIDE 0.4 MG: 0.4 CAPSULE ORAL at 19:08

## 2019-05-03 RX ADMIN — POLYETHYLENE GLYCOL 3350 17 G: 17 POWDER, FOR SOLUTION ORAL at 08:45

## 2019-05-03 RX ADMIN — SENNOSIDES AND DOCUSATE SODIUM 2 TABLET: 8.6; 5 TABLET ORAL at 19:08

## 2019-05-03 ASSESSMENT — ACTIVITIES OF DAILY LIVING (ADL)
ADLS_ACUITY_SCORE: 14

## 2019-05-03 NOTE — DISCHARGE SUMMARY
ORTHOPAEDIC DISCHARGE SUMMARY     Date of Admission: 5/1/2019  Date of Discharge: 5-4-2019  Disposition: TCU  Staff Physician: Espinoza Zaragoza MD  Primary Care Provider: Mariano Valdez    DISCHARGE DIAGNOSIS:  Left Hip Osteoarthritis    PROCEDURES: Procedure(s):  Left Total Hip Arthroplasty on 5/1/2019    BRIEF HISTORY:  The patient has a long history of debilitating pain secondary to osteoarthritis of the hip.  Despite comprehensive non-operative management these symptoms continued to interfere with activities of daily living.  After discussion of further treatment options including the risks and benefits that patient elected to proceed with a total hip.    HOSPITAL COURSE:    Surgery was uncomplicated. Les Kraus has done well post-operatively. Medicine was consulted post operatively to aid in management of medical comorbidities. See final recommendations below. The patient received routine nursing cares and is medically stable. Vital signs are stable. The patient is tolerating a regular diet without GI distress/nausea or vomiting. Voiding spontaneously. All PT/OT goals have been met for safe mobility. Pain is now controlled on oral medications which will be available on discharge. Stool softeners have been used while taking pain medications to help prevent constipation. Les Kraus is deemed medically safe to discharge.     Antibiotics:  Cefazolin given periop and 24 hours postop.  DVT prophylaxis:  Ambulatory with SCD's  PT Progress:  Has met PT/OT goals for safe mobility.   Pain Meds:  Weaned off all IV pain meds by discharge.  Inpatient Events: No significant events or complications.     Discharge orders and instructions as below.    FOLLOWUP:    Future Appointments   Date Time Provider Department Center   5/3/2019  3:30 PM Daniela Sarkar, PT [facility] [place]   5/24/2019  1:00 PM Espinoza Granger PA-C [facility]   6/13/2019  1:00 PM Espinoza Zaragoza MD [facility]       Appointments are at  the [facility] ([address] [place] [address], [id number]). Call [phone] if you haven't heard regarding these appointments within 7 days of discharge.    PLANNED DISCHARGE ORDERS:     DVT Prophylaxis: Ambulatory      Current Discharge Medication List      START taking these medications    Details   acetaminophen (TYLENOL) 325 MG tablet Take 3 tablets (975 mg) by mouth every 8 hours Continue for 3 days then change to 650 mg every 4 hours as needed for pain    Associated Diagnoses: Status post hip surgery      aspirin (ASA) 325 MG EC tablet Take 1 tablet (325 mg) by mouth daily for 14 days    Associated Diagnoses: Status post hip surgery      bisacodyl (DULCOLAX) 10 MG suppository Place 1 suppository (10 mg) rectally daily as needed for constipation  Qty: 1 suppository, Refills: 0    Associated Diagnoses: Status post hip surgery      enoxaparin (LOVENOX) 40 MG/0.4ML syringe Inject 0.4 mLs (40 mg) Subcutaneous every 24 hours for 11 days  Qty: 4.4 mL, Refills: 0    Comments: Take Lovenox until 5/15/19, then take aspirin from 5/16/19 - 5/29/19 to prevent blood clots.  Associated Diagnoses: Status post hip surgery      Lidocaine (LIDOCARE) 4 % Patch Place 1-3 patches onto the skin every 24 hours    Associated Diagnoses: Status post hip surgery      methocarbamol (ROBAXIN) 500 MG tablet Take 1 tablet (500 mg) by mouth 4 times daily as needed for muscle spasms    Associated Diagnoses: Status post hip surgery      oxyCODONE (ROXICODONE) 5 MG tablet Take 1-2 tablets (5-10 mg) by mouth every 4 hours as needed for severe pain  Qty: 40 tablet, Refills: 0    Comments: For pain of 3-6 give 5 mg,for pain of 7-10 give 10 mg every 4 hours as needed  Associated Diagnoses: Status post hip surgery      polyethylene glycol (MIRALAX/GLYCOLAX) packet Take 17 g by mouth daily    Associated Diagnoses: Status post hip surgery      senna-docusate (SENOKOT-S/PERICOLACE) 8.6-50 MG tablet Take 1 tablet by mouth 2 times daily     Associated Diagnoses: Status post hip surgery      tamsulosin (FLOMAX) 0.4 MG capsule Take 1 capsule (0.4 mg) by mouth every 24 hours    Associated Diagnoses: Status post hip surgery         CONTINUE these medications which have NOT CHANGED    Details   amLODIPine (NORVASC) 5 MG tablet          STOP taking these medications       amoxicillin (AMOXIL) 500 MG tablet Comments:   Reason for Stopping:         EPIPEN 2-ALEXX 0.3 MG/0.3ML injection Comments:   Reason for Stopping:         Multiple Vitamins-Minerals (MULTIVITAMIN & MINERAL PO) Comments:   Reason for Stopping:                 Discharge Procedure Orders   Physical Therapy Referral     General info for SNF   Order Comments: Length of Stay Estimate: Short Term Care: Estimated # of Days <30  Condition at Discharge: Improving  Level of care:skilled   Rehabilitation Potential: Good  Admission H&P remains valid and up-to-date: Yes  Recent Chemotherapy: N/A  Use Nursing Home Standing Orders: Yes     Mantoux instructions   Order Comments: Give two-step Mantoux (PPD) Per Facility Policy Yes     Reason for your hospital stay   Order Comments: You were admitted to the hospital following your total hip arthroplasty.     Adult [facility] Follow-up and recommended labs and tests   Order Comments: You are to return to clinic in 2 weeks for wound check with the clinic nurse. Approximately 6 weeks after your surgery, you will be scheduled for an appointment with Dr. Zaragoza. At this time, AP pelvis imaging will be obtained.    If you need to schedule your 2 and 6 week postoperative visits or haven't received confirmation regarding these visits, please call one of the following numbers within 7 days of discharge.    For patients that see Dr. Zaragoza at:   -[facility]: [phone]  -[facility] [place]: [phone]  [facility]: [phone]     Discharge Instructions   Order Comments: TOTAL HIP ARTHROPLASTY POST OPERATIVE  DISCHARGE INSTRUCTIONS    FOLLOW UP APPOINTMENT  You are scheduled for a post operative wound check with Dr. Zaragoza's clinic approximately two weeks after surgery. At approximately six weeks after surgery, you will see Dr. Zaragoza in clinic. During this visit, repeat X rays of your operative hip will be performed.      Your follow up appointments will be at the location that you regularly see Dr. Zaragoza:    [facility]  [address], [place] [id number] [phone]    [address] [address] [phone]    [facility]  [address], [place] [address] [phone]    Physical therapy:   A referral for physical therapy will be provided at the time of discharge.       ACTIVITY  Weight bearing status:   You may bear weight on your operative extremity as tolerated, using assistive devices (walker, cane) as needed. As you begin to feel more comfortable ambulating, you may gradually transition from a walker to a cane. Eventually, you should wean from all assistive devices. Although we would like you to discontinue use of assistive devices as soon as possible, do not transition until you have worked with your physical therapist to achieve safe balance and comfort.     Posterior hip precautions:  You must maintain the following posterior hip precautions for the next 12 weeks with no exceptions:  1) no hip flexion >90 degrees (no bending down at the waist)  2) no internal rotation past neutral (no pivoting)  3) no adduction past midline (no crossing your legs)    Exercises:   Perform the following exercises at least three times per day for the first four weeks after surgery to prevent complications, such as blood clots in your legs:  1) Point and flex your feet  2) Move your ankle around in big circles  3) Wiggle your toes   Also, perform thigh muscle tightening  exercises for 10 to 15 minutes at least three times per day for the first four weeks after surgery.    Athletic activities:  Activities such as swimming, bicycling, jogging, running, and stop-and-go sports should be avoided until directed by your provider.    Driving:  Driving is not permitted until directed by your provider. Under no circumstance are you permitted to drive while using narcotic pain medications.    Return to work:  You may return to work when directed by your provider.       COMFORT AND PAIN MANAGEMENT  Icing:  An ice pack will be provided to control swelling and discomfort after surgery. Place a thin towel on your skin and apply the ice pack overtop. You may apply ice for 20 minutes as often as two times per hour.    Pain medications & tapering:  You will be discharged with acetaminophen (Tylenol) and a narcotic medication for pain management after surgery. Acetaminophen is most effective when it is taken per the schedule outlined by your provider (every four, six, or eight hours as prescribed). You may safely use acetaminophen as prescribed for the first four weeks after surgery provided you do not exceed the maximum daily dose prescribed by your provider (usually 3000 mg - 4000 mg). The narcotic pain medication should only be taken on an as-needed basis when necessary and should be reserved for severe pain that is not controlled with scheduled acetaminophen. In the first three days following surgery, your symptoms may warrant use of the narcotic pain medication every three, four, or six hours as prescribed. After three days, focus your efforts on decreasing (tapering) use of narcotic medications. The most successful tapering strategy is to first, decrease the dose (number of tablets) and second, decrease the interval (time   in between doses). For example, if you begin taking two tablets every four hours after surgery, start your taper by decreasing one of these doses to one tablet. Every one to  two days, decrease another dose to one tablet until you are eventually taking one tablet every four hours. Once this is achieved, focus on increasing the number of hours between doses, moving from one tablet every four hours to one tablet every six hours. As tolerated, continue to increase the interval to eight and twelve hours. Eventually, taper to one dose every evening and discontinue when no longer needed.      ANTICOAGULATION  Take enoxaparin (Lovenox) as prescribed for a total of two weeks after surgery. After you complete your enoxaparin regimen, take one aspirin 325mg daily for the next two weeks.       WOUND CARE AND SHOWERING  Wound care:  You have a clean Aquacel dressing on your surgical wound. This dressing should stay in place for ten days to allow the incision to heal. If the Aquacel dressing becomes excessively wet or saturated before removal on day ten, please contact Dr. Zaragoza's office. After ten days, remove the dressing and leave the wound open to air (see showering instructions below). If there is any drainage from the incision after removal of the Aquacel, dress the wound with sterile 4x4 gauze, using tape over top to secure the dressing in place over the incision. Please contact Dr. Zaragoza's office if you notice the following after removal of the Aquacel dressin) significant cloudy, bloody, or malodorous drainage from the incision, 2) excessive warmth and redness around the incision.    Showering:  You may shower 48 hours after surgery provided the Aquacel dressing is in place. Although you are strictly prohibited from soaking or submerging the surgical wound underwater, you may shower in the usual fashion, allowing water and soap to run over the Aquacel. Once the Aquacel is removed on the tenth day after surgery, you may continue to shower; however, the incision should be covered with saran wrap (or any other non-permeable covering) to allow the incision to remain dry and to prevent you  from scrubbing/rubbing the incision. The steri-strips (small white tape that is directly on the incision areas) should be left on until they fall off or are removed at your first office visit. After your incision is examined at your first office visit, you will likely be allowed to return to showering without covering the incision.     Tub bathing:  Tub bathing, swimming, or any other activities that cause your incision to be submerged should be avoided until allowed by your provider. Typically, patients are allowed to return to these activities six weeks after surgery pending clearance by your provider.      CONTACTING YOUR PROVIDER:  You may experience symptoms that require follow-up before your scheduled two week appointment. Please contact Dr. Zaragoza's office if you experience:  1) Pain that persists or worsens in the first few days after surgery  2) Excessive redness or drainage of cloudy or bloody material from the wounds (clear red tinted fluid and some mild drainage should be expected) or drainage of any kind five days after surgery  3) A temperature elevation greater than 101.5    4) Pain, swelling or redness in your calf  5) Numbness or weakness in your leg or foot      Regular business hours (Monday - Friday, 8am - 5pm):  [facility] [place]: [phone]  [facility]: [phone]  [facility]: [phone]    After hours and weekends:  [facility] on call Orthopedic resident: [phone]  IMPORTANT: Resident cannot prescribe narcotics.     Wound care and dressings   Order Comments: Wound care:  You have a clean Aquacel dressing on your surgical wound. This dressing should stay in place for ten days to allow the incision to heal. If the Aquacel dressing becomes excessively wet or saturated before removal on day ten, please contact Dr. Zaragoza's office. After ten days, remove the dressing and leave the  wound open to air (see showering instructions below). If there is any drainage from the incision after removal of the Aquacel, dress the wound with sterile 4x4 gauze, using tape over top to secure the dressing in place over the incision. Please contact Dr. Zaragoza's office if you notice the following after removal of the Aquacel dressin) significant cloudy, bloody, or malodorous drainage from the incision, 2) excessive warmth and redness around the incision.    Showering:  You may shower 48 hours after surgery provided the Aquacel dressing is in place. Although you are strictly prohibited from soaking or submerging the surgical wound underwater, you may shower in the usual fashion, allowing water and soap to run over the Aquacel. Once the Aquacel is removed on the tenth day after surgery, you may continue to shower; however, the incision should be covered with saran wrap (or any other non-permeable covering) to allow the incision to remain dry and to prevent you from scrubbing/rubbing the incision. The steri-strips (small white tape that is directly on the incision areas) should be left on until they fall off or are removed at your first office visit. After your incision is examined at your first office visit, you will likely be allowed to return to showering without covering the incision.     Tub bathing:  Tub bathing, swimming, or any other activities that cause your incision to be submerged should be avoided until allowed by your provider. Typically, patients are allowed to return to these activities six weeks after surgery pending clearance by your provider.     Activity   Order Comments: Weight bearing status:   You may bear weight on your operative extremity as tolerated, using assistive devices (walker, cane) as needed. As you begin to feel more comfortable ambulating, you may gradually transition from a walker to a cane. Eventually, you should wean from all assistive devices. Although we would like you to  discontinue use of assistive devices as soon as possible, do not transition until you have worked with your physical therapist to achieve safe balance and comfort.     Posterior hip precautions:  You must maintain the following posterior hip precautions for the next 12 weeks with no exceptions:  1) no hip flexion >90 degrees (no bending down at the waist)  2) no internal rotation past neutral (no pivoting)  3) no adduction past midline (no crossing your legs)    Exercises:   Perform the following exercises at least three times per day for the first four weeks after surgery to prevent complications, such as blood clots in your legs:  1) Point and flex your feet  2) Move your ankle around in big circles  3) Wiggle your toes   Also, perform thigh muscle tightening exercises for 10 to 15 minutes at least three times per day for the first four weeks after surgery.    Athletic activities:  Activities such as swimming, bicycling, jogging, running, and stop-and-go sports should be avoided until directed by your provider.    Driving:  Driving is not permitted until directed by your provider. Under no circumstance are you permitted to drive while using narcotic pain medications.    Return to work:  You may return to work when directed by your provider.     Order Specific Question Answer Comments   Is discharge order? Yes      Discharge Instructions   Order Comments: BMP, CBC, plts 5/6 - results to provider for review.  CPAP as at home.     Full Code     Order Specific Question Answer Comments   Code status determined by: Discussion with patient/legal decision maker      Physical Therapy Adult Consult   Order Comments: Evaluate and treat as clinically indicated.    Reason:  Hip surgery     Occupational Therapy Adult Consult   Order Comments: Evaluate and treat as clinically indicated.    Reason:  Hip surgery     Diet   Order Comments: You may return to your regular, pre-surgery diet unless instructed otherwise by your  provider.     Order Specific Question Answer Comments   Is discharge order? Yes          Gregory Ruby, PGY4  Discharge summary updated by me to reflect changes in plan/medications.

## 2019-05-03 NOTE — PLAN OF CARE
Discharge Planner PT   Patient plan for discharge: Rehab  Current status: Focus on IND with mobility and L LE strength.  Pt needing CGA-Joleen with transfers and CGA for bed mobility.  Pt ambulated 250' with use of FWW and CGA, cues for posture through out.  Also completed L LE strengthening HEP.   Barriers to return to prior living situation: Need for increased assist with transfers and ambulation  Recommendations for discharge: TCU  Rationale for recommendations: To progress strength and IND/safety with functional mobility       Entered by: Daniela Sarkar 05/03/2019 4:05 PM

## 2019-05-03 NOTE — PLAN OF CARE
Pt. A&Ox4. VSS. Afebrile. Lung sounds CTA. Maintaining sats w/ nasal cpap overnight. IS encouraged. Bowel sounds active, LBM 4/30 per pt report, flatus +. PP+ DP+. CMS and neuro's are intact. Denies numbness and tingling in all extremities. Denies nausea, shortness of breath, and chest pain. Pain is comfortably managed w/ scheduled Tylenol, prn oxycodone and ice applied. Voids spontaneously without difficulty, PVRs around 100mL. Continue to offer toileting. Tolerating regular diet. L hip incisional dressing is CDI. Pt up with ax1 and walker w/ gait belt. No IV access. PCD's on BLE's. Bilateral heels are elevated off the bed. Call light is within reach, pt able to make needs known and is resting comfortably between cares. Will continue to monitor.

## 2019-05-03 NOTE — PLAN OF CARE
Discharge Planner OT   Patient plan for discharge: TCU  Current status: Supine>sit with min A. Patient ambulated to/from bathroom using FWW with CGA. Completed toilet transfer using BSC as overlay with CGA. Patient with noticeable shaking throughout session, sitting in chair and provided with warm blanket at end of session. SOB with activity but O2 sats high 90's on RA. Informed RN.   Barriers to return to prior living situation: pain, post op precautions, assist with mobility and ADLs  Recommendations for discharge: TCU  Rationale for recommendations: To maximize safety and IND with functional mobility and ADLs/IADLs       Entered by: JJ DODSON 05/03/2019 1:57 PM

## 2019-05-03 NOTE — PROGRESS NOTES
Social Work Services Progress Note    Hospital Day: 3    Collaborated with:  Pt, 8A IDT, [facility] [phone]    Data:  Discharge plan    Intervention:  Pt has been accepted for TCU at [facility], and anticipates needing WC ride for transportation. Per Keo in Admissions at [facility], bed is available any time.    PAS/RR completed via [employer], Confirmation [id number]. Keo requests Saturday SW contact admissions with confirmation of admission on Saturday and with discharge time from hospital.     Assessment:  pt pleased with discharge plan and thanked SW for effort/work involved    Plan:    Anticipated Disposition:  Facility:  [facility] [phone]    Barriers to d/c plan:  Medical stability, anticipate discharge Saturday, 5/4    Follow Up:  BENITO con't to follow    Addendum:    BENITO arranged ride for [facility]  at 10:45 on Saturday, 5/4. BENITO notified Dr Morin, Roseann Johnson, NP , pt, admissions at [facility].

## 2019-05-03 NOTE — PROGRESS NOTES
Orthopedic Surgery Progress Note    Subjective: NAEON. Feeling quite good. Pain well controlled. Denies new n/t, f, c, CP, SOB, N/V. States he is going to TCU.    Exam:  /70 (BP Location: Left arm)   Pulse 69   Temp 98.7  F (37.1  C) (Oral)   Resp 16   SpO2 94%   Gen: Awake, alert, NAD  Resp: breathing equal and non-labored  Extremities:  LLE: Aquacel CDI. 5/5 EHL/FHL/TA/Gsc. SILT in s/s/dp/sp/t distributions. 2+ DP and PT pulses. Toes WWP, good cap refill.      Labs:    Recent Labs   Lab 05/03/19  0532 05/02/19 0527 05/01/19 2107   HGB 12.8* 12.8*  --    PLT  --   --  221     Recent Labs   Lab 05/03/19 0532 05/02/19 0527 05/01/19 2107    141  --    POTASSIUM 3.9 3.8  --    CHLORIDE 108 108  --    CO2 26 27  --    BUN 20 25  --    CR 1.12 1.20 1.12   * 112*  --    MAG  --  2.3  --      No lab results found in last 7 days.    Imaging: Satisfactory positioning of MARC components. No new fxs/dislocations.    Assessment:   Les Kraus is a 73 year old male s/p Procedure(s):  Left Total Hip Arthroplasty on 5/1/2019 with Dr. Zaragoza.     Activity: Up with assist and assistive devices as needed until independent. Posterior hip precautions to operative side x 3 months:  1) No hip flexion beyond 90 degrees  2) No adduction beyond midline  3) No internal rotation beyond neutral   Weight bearing status: WBAT  Antibiotics: Cefazolin x 24 hours  Diet: Begin with clear fluids and progress diet as tolerated. Bowel regimen. Anti-emetics PRN.    DVT prophylaxis: Mechanical while in hospital with Lovenox x 2 weeks, followed by aspirin x 2 weeks  Elevation: Elevate heels off of bed on pillows   Wound Care: Aquacel x 5-7 days.    Drains: none  Pain management: Orals PRN, IV for breakthrough only  X-rays: AP Pelvis and Lateral operative hip in PACU.   Physical Therapy: Mobilization, ROM, ADL's, Posterior hip precautions.    Occupational Therapy: ADL's  Labs: Trend Hgb on POD #1, 2  Consults: PT, OT.  Hospitalist, appreciate assistance in caring for this patient throughout the perioperative period    -Disposition: Pending pain control, PT/OT, and medical clearance, anticipate that the patient will discharge to TCU tomorrow.    Future Appointments   Date Time Provider Department Center   5/2/2019  9:45 AM Sloane Hansen Pt, PT [facility] [place]   5/2/2019  1:30 PM Sloane Hansen Pt, PT [facility] [place]   5/24/2019  1:00 PM Espinoza Granger PA-C [facility]   6/13/2019  1:00 PM Espinoza Zaragoza MD [facility]        Gregory Argueta [facility]  Orthopaedic PGY4  [phone] (pager)

## 2019-05-03 NOTE — PROGRESS NOTES
[facility] Internal Medicine Progress Note            Interval History:   Record reviewed.  Seen with RN.  S/P KIERA TRAN 5/1 Dr. Zaragoza.   ml's. Low grade fever last PM, resolved.  In general feeling well.   Adequate pain control with oxycodone 5 mg and tylenol.  Ambulated without LH.   No CP, SOB, cough.   CPAP over night.  No nausea, reflux, abd pain or distention.  Last BM 3 days ago.  Slow stream.  PVR < 300.  Flomax added.          Medications:   All medications reviewed today          Physical Exam:   Blood pressure 149/83, pulse 69, temperature 98.6  F (37  C), temperature source Oral, resp. rate 14, SpO2 96 %.    Intake/Output Summary (Last 24 hours) at 5/2/2019 1534  Last data filed at 5/1/2019 1744  Gross per 24 hour   Intake --   Output 275 ml   Net -275 ml       General:  Alert.  Appropriate.  No distress.  Off O2.     Heent:      Neck:    Skin:    Chest:  clear    Cardiac:  Reg without gallop, murmur.  No JVD.     Abdomen:  Mildly distended, soft, non-tender.  BS normal.     Extremities:  Perfused.  No edema, calf, posterior thigh tenderness to suggest DVT.     Neuro:            Data:     Results for orders placed or performed during the hospital encounter of 05/01/19 (from the past 24 hour(s))   Urine Culture Aerobic Bacterial   Result Value Ref Range    Specimen Description Midstream Urine     Special Requests Specimen received in preservative     Culture Micro Culture negative < 24 hours, reincubate    UA with Microscopic   Result Value Ref Range    Color Urine Light Yellow     Appearance Urine Clear     Glucose Urine Negative NEG^Negative mg/dL    Bilirubin Urine Negative NEG^Negative    Ketones Urine Negative NEG^Negative mg/dL    Specific Gravity Urine 1.007 1.003 - 1.035    Blood Urine Negative NEG^Negative    pH Urine 6.0 5.0 - 7.0 pH    Protein Albumin Urine Negative NEG^Negative mg/dL    Urobilinogen mg/dL Normal 0.0 - 2.0 mg/dL    Nitrite Urine Negative NEG^Negative    Leukocyte  Esterase Urine Negative NEG^Negative    Source Midstream Urine     WBC Urine <1 0 - 5 /HPF    RBC Urine 1 0 - 2 /HPF    sperm Present (A) NEG^Negative /HPF   Hemoglobin   Result Value Ref Range    Hemoglobin 12.8 (L) 13.3 - 17.7 g/dL   Basic metabolic panel   Result Value Ref Range    Sodium 140 133 - 144 mmol/L    Potassium 3.9 3.4 - 5.3 mmol/L    Chloride 108 94 - 109 mmol/L    Carbon Dioxide 26 20 - 32 mmol/L    Anion Gap 6 3 - 14 mmol/L    Glucose 105 (H) 70 - 99 mg/dL    Urea Nitrogen 20 7 - 30 mg/dL    Creatinine 1.12 0.66 - 1.25 mg/dL    GFR Estimate 64 >60 mL/min/[1.73_m2]    GFR Estimate If Black 74 >60 mL/min/[1.73_m2]    Calcium 8.2 (L) 8.5 - 10.1 mg/dL                Assessment and Plan:   1)  S/P L MARC 5/1 Dr. Zaragoza.   ml's.  Mobilizing.  Adequate pain control.  2)  Acute blood loss anemia, mild.  Adequate.  3)  Hx of Anaphylaxis with exercise after po intake (etiology unclear).  Quiescent.  4)  HTN -  borderline on norvasc with parameters.  5)  MEHUL on CPAP - hypoxemia contributed to by anesthesia and opiate related sedation with hypoventilation, atelectasis.  Resolved.   6)  Mild interval Cr rise (same issue last admit), possible effect from anesthesia, volume depletion, toradol.  Improved.  6)  BPH with urine retention (not requiring velazquez).  On flomax.        PLAN:  1)  Review meds, orders.  2)  SL IV.   3)  IS, same opiates, bowel meds (supp later), lovenox, mobilize.  4)  Flomax.  5)  No toradol.  6)  Trend labs. Monitor clinically.   Disposition Plan   Expected discharge in 1 days to transitional care unit .     Entered: Alvaro Morin 05/03/2019, 11:36 AM              Attestation:  I have reviewed today's vital signs, notes, medications, labs and imaging.     Alvaro Morin MD

## 2019-05-04 ENCOUNTER — APPOINTMENT (OUTPATIENT)
Dept: PHYSICAL THERAPY | Facility: CLINIC | Age: 74
DRG: 470 | End: 2019-05-04
Attending: ORTHOPAEDIC SURGERY
Payer: MEDICARE

## 2019-05-04 ENCOUNTER — APPOINTMENT (OUTPATIENT)
Dept: OCCUPATIONAL THERAPY | Facility: CLINIC | Age: 74
DRG: 470 | End: 2019-05-04
Attending: ORTHOPAEDIC SURGERY
Payer: MEDICARE

## 2019-05-04 VITALS
SYSTOLIC BLOOD PRESSURE: 157 MMHG | DIASTOLIC BLOOD PRESSURE: 84 MMHG | HEART RATE: 82 BPM | OXYGEN SATURATION: 98 % | RESPIRATION RATE: 15 BRPM | TEMPERATURE: 97.4 F

## 2019-05-04 LAB
ANION GAP SERPL CALCULATED.3IONS-SCNC: 4 MMOL/L (ref 3–14)
BASOPHILS # BLD AUTO: 0 10E9/L (ref 0–0.2)
BASOPHILS NFR BLD AUTO: 0.2 %
BUN SERPL-MCNC: 19 MG/DL (ref 7–30)
CALCIUM SERPL-MCNC: 8.2 MG/DL (ref 8.5–10.1)
CHLORIDE SERPL-SCNC: 109 MMOL/L (ref 94–109)
CO2 SERPL-SCNC: 26 MMOL/L (ref 20–32)
CREAT SERPL-MCNC: 1.11 MG/DL (ref 0.66–1.25)
DIFFERENTIAL METHOD BLD: NORMAL
EOSINOPHIL # BLD AUTO: 0.2 10E9/L (ref 0–0.7)
EOSINOPHIL NFR BLD AUTO: 2.5 %
GFR SERPL CREATININE-BSD FRML MDRD: 65 ML/MIN/{1.73_M2}
GLUCOSE SERPL-MCNC: 100 MG/DL (ref 70–99)
HGB BLD-MCNC: 12.7 G/DL (ref 13.3–17.7)
IMM GRANULOCYTES # BLD: 0.1 10E9/L (ref 0–0.4)
IMM GRANULOCYTES NFR BLD: 0.5 %
LYMPHOCYTES # BLD AUTO: 0.9 10E9/L (ref 0.8–5.3)
LYMPHOCYTES NFR BLD AUTO: 8.8 %
MONOCYTES # BLD AUTO: 1.2 10E9/L (ref 0–1.3)
MONOCYTES NFR BLD AUTO: 12.5 %
NEUTROPHILS # BLD AUTO: 7.3 10E9/L (ref 1.6–8.3)
NEUTROPHILS NFR BLD AUTO: 75.5 %
NRBC # BLD AUTO: 0 10*3/UL
NRBC BLD AUTO-RTO: 0 /100
PLATELET # BLD AUTO: 228 10E9/L (ref 150–450)
POTASSIUM SERPL-SCNC: 3.8 MMOL/L (ref 3.4–5.3)
SODIUM SERPL-SCNC: 139 MMOL/L (ref 133–144)
WBC # BLD AUTO: 9.7 10E9/L (ref 4–11)

## 2019-05-04 PROCEDURE — 97110 THERAPEUTIC EXERCISES: CPT | Mod: GP | Performed by: REHABILITATION PRACTITIONER

## 2019-05-04 PROCEDURE — 97535 SELF CARE MNGMENT TRAINING: CPT | Mod: GO

## 2019-05-04 PROCEDURE — 85025 COMPLETE CBC W/AUTO DIFF WBC: CPT | Performed by: ORTHOPAEDIC SURGERY

## 2019-05-04 PROCEDURE — A9270 NON-COVERED ITEM OR SERVICE: HCPCS | Performed by: INTERNAL MEDICINE

## 2019-05-04 PROCEDURE — A9270 NON-COVERED ITEM OR SERVICE: HCPCS | Performed by: STUDENT IN AN ORGANIZED HEALTH CARE EDUCATION/TRAINING PROGRAM

## 2019-05-04 PROCEDURE — 80048 BASIC METABOLIC PNL TOTAL CA: CPT | Performed by: ORTHOPAEDIC SURGERY

## 2019-05-04 PROCEDURE — 97116 GAIT TRAINING THERAPY: CPT | Mod: GP | Performed by: REHABILITATION PRACTITIONER

## 2019-05-04 PROCEDURE — 85004 AUTOMATED DIFF WBC COUNT: CPT | Performed by: INTERNAL MEDICINE

## 2019-05-04 PROCEDURE — 25000132 ZZH RX MED GY IP 250 OP 250 PS 637: Performed by: STUDENT IN AN ORGANIZED HEALTH CARE EDUCATION/TRAINING PROGRAM

## 2019-05-04 PROCEDURE — 97530 THERAPEUTIC ACTIVITIES: CPT | Mod: GP | Performed by: REHABILITATION PRACTITIONER

## 2019-05-04 PROCEDURE — 99232 SBSQ HOSP IP/OBS MODERATE 35: CPT | Performed by: INTERNAL MEDICINE

## 2019-05-04 PROCEDURE — 97530 THERAPEUTIC ACTIVITIES: CPT | Mod: GO

## 2019-05-04 PROCEDURE — 25000132 ZZH RX MED GY IP 250 OP 250 PS 637: Performed by: INTERNAL MEDICINE

## 2019-05-04 PROCEDURE — 25000128 H RX IP 250 OP 636: Performed by: STUDENT IN AN ORGANIZED HEALTH CARE EDUCATION/TRAINING PROGRAM

## 2019-05-04 PROCEDURE — 36415 COLL VENOUS BLD VENIPUNCTURE: CPT | Performed by: ORTHOPAEDIC SURGERY

## 2019-05-04 RX ORDER — ASPIRIN 325 MG
325 TABLET, DELAYED RELEASE (ENTERIC COATED) ORAL DAILY
DISCHARGE
Start: 2019-05-16 | End: 2019-05-30

## 2019-05-04 RX ADMIN — OXYCODONE HYDROCHLORIDE 5 MG: 5 TABLET ORAL at 10:05

## 2019-05-04 RX ADMIN — SENNOSIDES AND DOCUSATE SODIUM 2 TABLET: 8.6; 5 TABLET ORAL at 08:38

## 2019-05-04 RX ADMIN — AMLODIPINE BESYLATE 5 MG: 5 TABLET ORAL at 08:38

## 2019-05-04 RX ADMIN — ACETAMINOPHEN 975 MG: 325 TABLET, FILM COATED ORAL at 06:07

## 2019-05-04 RX ADMIN — ENOXAPARIN SODIUM 40 MG: 40 INJECTION SUBCUTANEOUS at 08:38

## 2019-05-04 ASSESSMENT — ACTIVITIES OF DAILY LIVING (ADL)
ADLS_ACUITY_SCORE: 15

## 2019-05-04 NOTE — PLAN OF CARE
VS: /88 (BP Location: Left arm)   Pulse 69   Temp 98.6  F (37  C) (Oral)   Resp 16   SpO2 96%      O2: Room air   Output: Voids spontaneously without difficulty.  Last    Last BM: 5/3, hard   Activity: Up in room, assist of 1   Up for meals? Yes   Skin: Incision   Pain: 5-10 oxy q 4   CMS: Intact   Dressing: CDI   Diet: Regular   LDA: No IV access   Equipment: Walker   Plan: TCU   Additional Info: Alert and oriented, uses call light appropriately

## 2019-05-04 NOTE — PLAN OF CARE
Discharge Planner PT   Patient plan for discharge: TCU   Current status: pt is SBA for all bed mob, and sit to stand to WW. Pt able to follow all hihp precautions during PT session pt demo amb up to 200'x 1 with WW needing V.c for tech. Pt demo supine there ex 10.   Barriers to return to prior living situation: pain weakness.   Recommendations for discharge: PT - per plan established by the Physical Therapist, according to functional mobility the  discharge recommendation is TCU for cont skilled PT   Rationale for recommendations: pt needing cont skilled PT for safe discharge to home.     At time of discharge pt met 4/5 goals  Unmet stair goal, not fully tested.   Pt to cont with skilled PT at TCU        Entered by: Kevin Mccann 05/04/2019 9:34 AM

## 2019-05-04 NOTE — PLAN OF CARE
Pt A/O X 4. Afebrile. VSS. Lungs-Clear bilaterally with both anterior and posterior. IS encouraged. Bowels-Hyperactive in all four quadrants, last BM 5-3-19. Voids spontaneously without difficulty into the urinal. Denies nausea and vomiting. CMS and Neuro's are intact. Denies numbness and tingling in all extremities. Has pain in the left hip and given 5mg Oxycodone and pain is well managed. Is on a Regular diet and appetite was Good this shift. Left hip incisional dressing is C/D/I. Pt up in room with A X 1 and a FWW. No PIV access. SANDHYA's and PCD's on BLE's. Bilateral heels are elevated off the bed. Pt is able to make needs known, and call light is within reach. Pt discharged @ 11:00AM and transported via [employer] to [facility]. Discharge paperwork given to pt and discharge paperwork faxed to [place] @ [facility] [phone]

## 2019-05-04 NOTE — PROGRESS NOTES
Orthopedic Surgery Progress Note    Subjective: Yesterday febrile to 102. Overnight afebrile and VSS.  Feeling quite good. Pain well controlled. Denies new n/t, chills, CP, SOB, N/V. States he is going to TCU.    Exam:  /84 (BP Location: Right arm)   Pulse 82   Temp 97.4  F (36.3  C) (Oral)   Resp 15   SpO2 98%   Gen: Awake, alert, NAD  Resp: breathing equal and non-labored  Extremities:  LLE: Aquacel CDI. 5/5 EHL/FHL/TA/Gsc. SILT in s/s/dp/sp/t distributions. 2+ DP and PT pulses. Toes WWP, good cap refill.      Labs:    Recent Labs   Lab 05/04/19 0543 05/03/19 0532 05/02/19 0527 05/01/19  2107   WBC 9.7  --   --   --    HGB 12.7* 12.8* 12.8*  --      --   --  221     Recent Labs   Lab 05/04/19 0543 05/03/19 0532 05/02/19 0527 05/01/19  2107    140 141  --    POTASSIUM 3.8 3.9 3.8  --    CHLORIDE 109 108 108  --    CO2 26 26 27  --    BUN 19 20 25  --    CR 1.11 1.12 1.20 1.12   * 105* 112*  --    MAG  --   --  2.3  --      No lab results found in last 7 days.    Imaging: Satisfactory positioning of MARC components. No new fxs/dislocations.    Assessment:   Les Kraus is a 73 year old male s/p Procedure(s):  Left Total Hip Arthroplasty on 5/1/2019 with Dr. Zaragoza.     Activity: Up with assist and assistive devices as needed until independent. Posterior hip precautions to operative side x 3 months:  1) No hip flexion beyond 90 degrees  2) No adduction beyond midline  3) No internal rotation beyond neutral   Weight bearing status: WBAT  Antibiotics: Cefazolin x 24 hours  Diet: Begin with clear fluids and progress diet as tolerated. Bowel regimen. Anti-emetics PRN.    DVT prophylaxis: Mechanical while in hospital with Lovenox x 2 weeks, followed by aspirin x 2 weeks  Elevation: Elevate heels off of bed on pillows   Wound Care: Aquacel x 5-7 days.    Drains: none  Pain management: Orals PRN, IV for breakthrough only  X-rays: AP Pelvis and Lateral operative hip in PACU.   Physical  Therapy: Mobilization, ROM, ADL's, Posterior hip precautions.    Occupational Therapy: ADL's  Labs: Trend Hgb on POD #1, 2  Consults: PT, OT. Hospitalist, appreciate assistance in caring for this patient throughout the perioperative period    -Disposition: Pending pain control, PT/OT, and medical clearance, anticipate that the patient will discharge to TCU today.    Future Appointments   Date Time Provider Department Center   5/2/2019  9:45 AM Sloane Hansen Pt, PT [facility]   5/2/2019  1:30 PM Sloane Hansen Pt, PT [facility]   5/24/2019  1:00 PM Espinoza Granger PA-C [facility]   6/13/2019  1:00 PM Espinoza Zaragoza MD [facility]        Gregory Ruby  Orthopaedic PGY4  [phone] (pager)

## 2019-05-04 NOTE — PROGRESS NOTES
"[facility] Internal Medicine Progress Note            Interval History:   Record reviewed.  Seen with RN.  Discussed with ortho.   S/P L MARC 5/1 Dr. Zaragoza.   ml's.   In general feeling well.   Anticipating discharge to [facility] today.  Fever to 102 last PM.  No recollected chilling.   Adequate pain control with oxycodone 5 mg and tylenol.  Ambulated without LH.   No CP, SOB, cough.   CPAP over night.  No nausea, reflux, abd pain or distention.  Last BM last night with supp.   Voiding \"OK\".  No dysuria.   On Flomax.  .           Medications:   All medications reviewed today          Physical Exam:   Blood pressure 157/84, pulse 82, temperature 97.4  F (36.3  C), temperature source Oral, resp. rate 15, SpO2 98 %.    Intake/Output Summary (Last 24 hours) at 5/2/2019 1534  Last data filed at 5/1/2019 1744  Gross per 24 hour   Intake --   Output 275 ml   Net -275 ml       General:  Alert.  Appropriate.  No distress.  Off O2.     Heent:      Neck:    Skin:  Swelling, induration, increase warmth adjacent to left hip wound (OK per ortho).     Chest:  clear    Cardiac:  Reg without gallop, murmur.  No JVD.     Abdomen:  Non  distended, soft, non-tender.  BS normal.     Extremities:  Perfused.  No edema, calf, posterior thigh tenderness to suggest DVT.     Neuro:            Data:     Results for orders placed or performed during the hospital encounter of 05/01/19 (from the past 24 hour(s))   Platelet count   Result Value Ref Range    Platelet Count 228 150 - 450 10e9/L   Basic metabolic panel   Result Value Ref Range    Sodium 139 133 - 144 mmol/L    Potassium 3.8 3.4 - 5.3 mmol/L    Chloride 109 94 - 109 mmol/L    Carbon Dioxide 26 20 - 32 mmol/L    Anion Gap 4 3 - 14 mmol/L    Glucose 100 (H) 70 - 99 mg/dL    Urea Nitrogen 19 7 - 30 mg/dL    Creatinine 1.11 0.66 - 1.25 mg/dL    GFR Estimate 65 >60 mL/min/[1.73_m2]    GFR Estimate If Black 75 >60 mL/min/[1.73_m2]    Calcium 8.2 (L) 8.5 - 10.1 " mg/dL   Hemoglobin   Result Value Ref Range    Hemoglobin 12.7 (L) 13.3 - 17.7 g/dL   WBC and differential   Result Value Ref Range    WBC 9.7 4.0 - 11.0 10e9/L    Diff Method Automated Method     % Neutrophils 75.5 %    % Lymphocytes 8.8 %    % Monocytes 12.5 %    % Eosinophils 2.5 %    % Basophils 0.2 %    % Immature Granulocytes 0.5 %    Nucleated RBCs 0 0 /100    Absolute Neutrophil 7.3 1.6 - 8.3 10e9/L    Absolute Lymphocytes 0.9 0.8 - 5.3 10e9/L    Absolute Monocytes 1.2 0.0 - 1.3 10e9/L    Absolute Eosinophils 0.2 0.0 - 0.7 10e9/L    Absolute Basophils 0.0 0.0 - 0.2 10e9/L    Abs Immature Granulocytes 0.1 0 - 0.4 10e9/L    Absolute Nucleated RBC 0.0                 Assessment and Plan:   1)  S/P L MARC 5/1 Dr. Zaragoza.   ml's.  Mobilizing.  Adequate pain control.  2)  Acute blood loss anemia, mild.  Adequate.  3)  Hx of Anaphylaxis with exercise after po intake (etiology unclear).  Quiescent.  4)  HTN -  borderline on norvasc with parameters.  5)  MEHUL on CPAP - hypoxemia contributed to by anesthesia and opiate related sedation with hypoventilation, atelectasis.  Resolved.   6)  Mild interval Cr rise (same issue last admit), possible effect from anesthesia, volume depletion, toradol.  Improved.  6)  BPH with urine retention (not requiring velazquez).  On flomax.  Improved voiding pattern.   7)  Fever likely related to post surgical change.  Suspect soft tissue hematoma.  Neg UA.  Normal WBC. No symptoms to suggest bacterial infection.  In general feeling well.       PLAN:  1)  Clinically appears stable for discharge to TCU.  2)  Meds, orders reviewed.  Lovenox followed by ASA.  Same opiates, flomax, bowel meds.  F/U labs 5/6.  CPAP.   Disposition Plan   Expected discharge today to transitional care unit .     Entered: Alvaro Morin 05/04/2019, 10:27 AM              Attestation:  I have reviewed today's vital signs, notes, medications, labs and imaging.     Alvaro Morin MD

## 2019-05-04 NOTE — PLAN OF CARE
Discharge Planner OT   Patient plan for discharge: TCU  Current status: Patient completes bed mobility with SBA, and sit > stand with min A. Patient demonstrates okay standing tolerance at sink as noted with shortness of breathe to complete g/h tasks. Reviewed AE with patient, and patient completes full body dressing with reacher for LE dressing. Patient left sitting in bedside chair with all needs left within reach.  Barriers to return to prior living situation: Pain, weakness  Recommendations for discharge: TCU  Rationale for recommendations: To maximize safety and independence with self-cares and functional mobility.       Entered by: Himanshu Heck 05/04/2019 8:09 AM     Occupational Therapy Discharge Summary    Reason for therapy discharge:    Discharged to transitional care facility.    Progress towards therapy goal(s). See goals on Care Plan in [facility] electronic health record for goal details.  Goals met    Therapy recommendation(s):    Continued therapy is recommended.  Rationale/Recommendations:  maximize safety and independence with self-cares and functional mobility..

## 2019-05-04 NOTE — PLAN OF CARE
Patient calm and cooperative with cares. AOx4. Denies SOB, chest pain, and calf pain. No PIV. Wearing CPAP overnight. Denies numbness and tingling of all extremities. No complaints of pain overnight. Voids spontaneously without difficulty, baseline slow stream. Up with 1x assist with gait belt and walker. Compression stockings on bilateral lower extremities. L hip dressing C/D/I. Resting in bed with call light in reach, able to make needs known.   OT scheduled at 0730. Plan to discharge to [facility] 5/4 with ride from [facility] at 1045.

## 2019-05-06 ENCOUNTER — AMBULATORY - HEALTHEAST (OUTPATIENT)
Dept: GERIATRICS | Facility: CLINIC | Age: 74
End: 2019-05-06

## 2019-05-06 ENCOUNTER — RECORDS - HEALTHEAST (OUTPATIENT)
Dept: LAB | Facility: CLINIC | Age: 74
End: 2019-05-06

## 2019-05-06 ENCOUNTER — OFFICE VISIT - HEALTHEAST (OUTPATIENT)
Dept: GERIATRICS | Facility: CLINIC | Age: 74
End: 2019-05-06

## 2019-05-06 DIAGNOSIS — G47.33 OBSTRUCTIVE SLEEP APNEA SYNDROME: ICD-10-CM

## 2019-05-06 DIAGNOSIS — I10 ESSENTIAL HYPERTENSION: ICD-10-CM

## 2019-05-06 DIAGNOSIS — Z96.641 HISTORY OF TOTAL RIGHT HIP REPLACEMENT: ICD-10-CM

## 2019-05-06 DIAGNOSIS — Z96.642 H/O TOTAL HIP ARTHROPLASTY, LEFT: ICD-10-CM

## 2019-05-06 LAB
ANION GAP SERPL CALCULATED.3IONS-SCNC: 9 MMOL/L (ref 5–18)
BUN SERPL-MCNC: 19 MG/DL (ref 8–28)
CALCIUM SERPL-MCNC: 9.8 MG/DL (ref 8.5–10.5)
CHLORIDE BLD-SCNC: 106 MMOL/L (ref 98–107)
CO2 SERPL-SCNC: 25 MMOL/L (ref 22–31)
CREAT SERPL-MCNC: 0.97 MG/DL (ref 0.7–1.3)
ERYTHROCYTE [DISTWIDTH] IN BLOOD BY AUTOMATED COUNT: 12.9 % (ref 11–14.5)
GFR SERPL CREATININE-BSD FRML MDRD: >60 ML/MIN/1.73M2
GLUCOSE BLD-MCNC: 76 MG/DL (ref 70–125)
HCT VFR BLD AUTO: 41.1 % (ref 40–54)
HGB BLD-MCNC: 13.4 G/DL (ref 14–18)
MCH RBC QN AUTO: 30.5 PG (ref 27–34)
MCHC RBC AUTO-ENTMCNC: 32.6 G/DL (ref 32–36)
MCV RBC AUTO: 94 FL (ref 80–100)
PLATELET # BLD AUTO: 371 THOU/UL (ref 140–440)
PMV BLD AUTO: 9.5 FL (ref 8.5–12.5)
POTASSIUM BLD-SCNC: 4 MMOL/L (ref 3.5–5)
RBC # BLD AUTO: 4.39 MILL/UL (ref 4.4–6.2)
SODIUM SERPL-SCNC: 140 MMOL/L (ref 136–145)
WBC: 7.7 THOU/UL (ref 4–11)

## 2019-05-06 ASSESSMENT — MIFFLIN-ST. JEOR: SCORE: 1796.31

## 2019-05-08 ENCOUNTER — OFFICE VISIT - HEALTHEAST (OUTPATIENT)
Dept: GERIATRICS | Facility: CLINIC | Age: 74
End: 2019-05-08

## 2019-05-08 DIAGNOSIS — I10 ESSENTIAL HYPERTENSION: ICD-10-CM

## 2019-05-08 DIAGNOSIS — Z96.642 H/O TOTAL HIP ARTHROPLASTY, LEFT: ICD-10-CM

## 2019-05-08 DIAGNOSIS — G47.33 OBSTRUCTIVE SLEEP APNEA SYNDROME: ICD-10-CM

## 2019-05-08 DIAGNOSIS — Z96.641 HISTORY OF TOTAL RIGHT HIP REPLACEMENT: ICD-10-CM

## 2019-05-08 ASSESSMENT — MIFFLIN-ST. JEOR: SCORE: 1796.31

## 2019-05-10 ENCOUNTER — OFFICE VISIT - HEALTHEAST (OUTPATIENT)
Dept: GERIATRICS | Facility: CLINIC | Age: 74
End: 2019-05-10

## 2019-05-10 DIAGNOSIS — I10 ESSENTIAL HYPERTENSION: ICD-10-CM

## 2019-05-10 DIAGNOSIS — G47.33 OBSTRUCTIVE SLEEP APNEA SYNDROME: ICD-10-CM

## 2019-05-10 DIAGNOSIS — Z96.642 STATUS POST TOTAL REPLACEMENT OF LEFT HIP: ICD-10-CM

## 2019-05-13 ENCOUNTER — OFFICE VISIT - HEALTHEAST (OUTPATIENT)
Dept: GERIATRICS | Facility: CLINIC | Age: 74
End: 2019-05-13

## 2019-05-13 DIAGNOSIS — Z96.642 H/O TOTAL HIP ARTHROPLASTY, LEFT: ICD-10-CM

## 2019-05-13 DIAGNOSIS — G47.33 OBSTRUCTIVE SLEEP APNEA SYNDROME: ICD-10-CM

## 2019-05-13 DIAGNOSIS — I10 ESSENTIAL HYPERTENSION: ICD-10-CM

## 2019-05-13 DIAGNOSIS — Z96.641 HISTORY OF TOTAL RIGHT HIP REPLACEMENT: ICD-10-CM

## 2019-05-13 ASSESSMENT — MIFFLIN-ST. JEOR: SCORE: 1884.31

## 2019-05-16 ENCOUNTER — AMBULATORY - HEALTHEAST (OUTPATIENT)
Dept: GERIATRICS | Facility: CLINIC | Age: 74
End: 2019-05-16

## 2019-05-24 ENCOUNTER — OFFICE VISIT (OUTPATIENT)
Dept: ORTHOPEDICS | Facility: CLINIC | Age: 74
End: 2019-05-24
Payer: MEDICARE

## 2019-05-24 DIAGNOSIS — Z96.641 STATUS POST TOTAL HIP REPLACEMENT, RIGHT: Primary | ICD-10-CM

## 2019-05-24 ASSESSMENT — ENCOUNTER SYMPTOMS
DYSURIA: 0
HEMATURIA: 0
DIFFICULTY URINATING: 0
FLANK PAIN: 0

## 2019-05-24 NOTE — PROGRESS NOTES
REASON FOR VIST/CC: Follow-up appointment following left MARC with Dr. Zaragoza on 05/01/2019.    HISTORY OF PRESENT ILLNESS:  Les Kraus is a 73 year old male who is approximately three weeks status post left MARC. He is doing well after surgery and presents without complaints. He is ambulating without use of assistive devices. He is no longer using medications for pain management, though he continues to ice. He is participating in PT and is progressing well. His last appointment will be next week. He is tolerating the posterior hip precautions and does not have any questions regarding these limitations.    The patient endorses some swelling around the surgical incision but denies excessive surrounding redness. The incision has been mostly dry, without discharge or drainage. Les denies recent fevers and chills and other symptoms concerning for infection.     Les is currently taking aspirin 325mg for DVT prophylaxis. He finished the enoxaparin one week prior. Patient denies calf pain or tenderness.      MEDICATIONS:   Current Outpatient Rx   Medication Sig Dispense Refill     amLODIPine (NORVASC) 5 MG tablet        aspirin (ASA) 325 MG EC tablet Take 1 tablet (325 mg) by mouth daily for 14 days       acetaminophen (TYLENOL) 325 MG tablet Take 3 tablets (975 mg) by mouth every 8 hours Continue for 3 days then change to 650 mg every 4 hours as needed for pain (Patient not taking: Reported on 5/24/2019)       bisacodyl (DULCOLAX) 10 MG suppository Place 1 suppository (10 mg) rectally daily as needed for constipation (Patient not taking: Reported on 5/24/2019) 1 suppository 0     Lidocaine (LIDOCARE) 4 % Patch Place 1-3 patches onto the skin every 24 hours (Patient not taking: Reported on 5/24/2019)       methocarbamol (ROBAXIN) 500 MG tablet Take 1 tablet (500 mg) by mouth 4 times daily as needed for muscle spasms (Patient not taking: Reported on 5/24/2019)       oxyCODONE (ROXICODONE) 5 MG tablet Take 1-2 tablets  (5-10 mg) by mouth every 4 hours as needed for severe pain (Patient not taking: Reported on 5/24/2019) 40 tablet 0     polyethylene glycol (MIRALAX/GLYCOLAX) packet Take 17 g by mouth daily (Patient not taking: Reported on 5/24/2019)       senna-docusate (SENOKOT-S/PERICOLACE) 8.6-50 MG tablet Take 1 tablet by mouth 2 times daily (Patient not taking: Reported on 5/24/2019)       tamsulosin (FLOMAX) 0.4 MG capsule Take 1 capsule (0.4 mg) by mouth every 24 hours (Patient not taking: Reported on 5/24/2019)           ALLERGIES: Patient has no known allergies.       PHYSICAL EXAMINATION:   On physical examination the patient is comfortable and is in no acute distress. The affect is appropriate and breathing is non-labored.    Surgical wound: The surgical wound was exposed and found to be clean and dry. There is mild edema around the incision. There is no erythema. The skin was appropriately warm to touch.     The patient ambulates with an antalgic gait, demonstrating weakness of the left gluteal muscles.    ASSESSMENT/PLAN:  Les Kraus is a 73 year old male status post left MARC with Dr. Zaragoza. The patient is progressing well.    Basic wound cares were performed at today's visit. Any remaining suture tails were clipped to the level of the skin. All remaining steristrips were removed. We spent time discussing future wound/incision cares.    He may shower.    The patient will see Dr. Zaragoza at six weeks post op. Les has our clinic number and will call with any questions or concerns.    Espinoza Granger PA-C  Orthopaedic Surgery       Answers for HPI/ROS submitted by the patient on 5/24/2019   General Symptoms: No  Skin Symptoms: No  HENT Symptoms: No  EYE SYMPTOMS: No  HEART SYMPTOMS: No  LUNG SYMPTOMS: No  INTESTINAL SYMPTOMS: No  URINARY SYMPTOMS: Yes  REPRODUCTIVE SYMPTOMS: No  SKELETAL SYMPTOMS: No  BLOOD SYMPTOMS: No  NERVOUS SYSTEM SYMPTOMS: No  MENTAL HEALTH SYMPTOMS: No  Trouble holding urine or incontinence:  No  Pain or burning: No  Trouble starting or stopping: No  Increased frequency of urination: No  Blood in urine: No  Decreased frequency of urination: No  Frequent nighttime urination: Yes  Flank pain: No  Difficulty emptying bladder: No

## 2019-05-24 NOTE — NURSING NOTE
Reason For Visit:   Chief Complaint   Patient presents with     Surgical Followup     2 week pop Left MARC.  DOS: 5/1/2019 with Dr. Zaragoza       There were no vitals taken for this visit.    Pain Assessment  Patient Currently in Pain: Eladia Godfrey, ATC

## 2019-05-24 NOTE — LETTER
5/24/2019       RE: Les Kraus  [address]  [place] [address]     Dear Colleague,    Thank you for referring your patient, Les Kraus, to [facility]. Please see a copy of my visit note below.    REASON FOR VIST/CC: Follow-up appointment following left MARC with Dr. Zaragoza on 05/01/2019.    HISTORY OF PRESENT ILLNESS:  Les Kraus is a 73 year old male who is approximately three weeks status post left MARC. He is doing well after surgery and presents without complaints. He is ambulating without use of assistive devices. He is no longer using medications for pain management, though he continues to ice. He is participating in PT and is progressing well. His last appointment will be next week. He is tolerating the posterior hip precautions and does not have any questions regarding these limitations.    The patient endorses some swelling around the surgical incision but denies excessive surrounding redness. The incision has been mostly dry, without discharge or drainage. Les denies recent fevers and chills and other symptoms concerning for infection.     Les is currently taking aspirin 325mg for DVT prophylaxis. He finished the enoxaparin one week prior. Patient denies calf pain or tenderness.    MEDICATIONS:   Current Outpatient Rx   Medication Sig Dispense Refill     amLODIPine (NORVASC) 5 MG tablet        aspirin (ASA) 325 MG EC tablet Take 1 tablet (325 mg) by mouth daily for 14 days       acetaminophen (TYLENOL) 325 MG tablet Take 3 tablets (975 mg) by mouth every 8 hours Continue for 3 days then change to 650 mg every 4 hours as needed for pain (Patient not taking: Reported on 5/24/2019)       bisacodyl (DULCOLAX) 10 MG suppository Place 1 suppository (10 mg) rectally daily as needed for constipation (Patient not taking: Reported on 5/24/2019) 1 suppository 0     Lidocaine (LIDOCARE) 4 % Patch Place 1-3 patches onto the skin  every 24 hours (Patient not taking: Reported on 5/24/2019)       methocarbamol (ROBAXIN) 500 MG tablet Take 1 tablet (500 mg) by mouth 4 times daily as needed for muscle spasms (Patient not taking: Reported on 5/24/2019)       oxyCODONE (ROXICODONE) 5 MG tablet Take 1-2 tablets (5-10 mg) by mouth every 4 hours as needed for severe pain (Patient not taking: Reported on 5/24/2019) 40 tablet 0     polyethylene glycol (MIRALAX/GLYCOLAX) packet Take 17 g by mouth daily (Patient not taking: Reported on 5/24/2019)       senna-docusate (SENOKOT-S/PERICOLACE) 8.6-50 MG tablet Take 1 tablet by mouth 2 times daily (Patient not taking: Reported on 5/24/2019)       tamsulosin (FLOMAX) 0.4 MG capsule Take 1 capsule (0.4 mg) by mouth every 24 hours (Patient not taking: Reported on 5/24/2019)         ALLERGIES: Patient has no known allergies.     PHYSICAL EXAMINATION:   On physical examination the patient is comfortable and is in no acute distress. The affect is appropriate and breathing is non-labored.    Surgical wound: The surgical wound was exposed and found to be clean and dry. There is mild edema around the incision. There is no erythema. The skin was appropriately warm to touch.     The patient ambulates with an antalgic gait, demonstrating weakness of the left gluteal muscles.    ASSESSMENT/PLAN:  Les Kraus is a 73 year old male status post left MARC with Dr. Zaragoza. The patient is progressing well.    Basic wound cares were performed at today's visit. Any remaining suture tails were clipped to the level of the skin. All remaining steristrips were removed. We spent time discussing future wound/incision cares.    He may shower.    The patient will see Dr. Zaragoza at six weeks post op. Les has our clinic number and will call with any questions or concerns.    Espinoza Granger PA-C  Orthopaedic Surgery

## 2019-06-04 DIAGNOSIS — M25.552 LEFT HIP PAIN: Primary | ICD-10-CM

## 2019-06-13 ENCOUNTER — ANCILLARY PROCEDURE (OUTPATIENT)
Dept: GENERAL RADIOLOGY | Facility: CLINIC | Age: 74
End: 2019-06-13
Attending: ORTHOPAEDIC SURGERY
Payer: MEDICARE

## 2019-06-13 ENCOUNTER — OFFICE VISIT (OUTPATIENT)
Dept: ORTHOPEDICS | Facility: CLINIC | Age: 74
End: 2019-06-13
Payer: MEDICARE

## 2019-06-13 DIAGNOSIS — Z47.89 ORTHOPEDIC AFTERCARE: Primary | ICD-10-CM

## 2019-06-13 ASSESSMENT — ACTIVITIES OF DAILY LIVING (ADL)
ADL_MEAN: .76
ADL_SUM: 13
ADL_SUBSCALE_SCORE: 80.88

## 2019-06-13 NOTE — LETTER
6/13/2019       RE: Les Kraus  [address]  [facility] [address]     Dear Colleague,    Thank you for referring your patient, Les Kraus, [facility] the [facility]. Please see a copy of my visit note below.    Chief Complaint: Surgical Followup (6 wk post-op total hip arthroplasty DOS 5/1/19  )     HPI: Les Kraus returns today in follow-up for his left total hip. He reportst that he is doing well. He is taking no pain medication and is not using an assist device. Happy with how he is doing.     Current Status:  Results of the patient s Hip Disability and Osteoarthritis Outcome Score (HOOS)  are as follows (0-100 scales with 100 being the theoretical best):  Pain: 90  Symptoms:95   ADLs: 80.88  Sports/Recreation:18.75   Quality of Life: 56.25  (http://koos.nu/)  [facility] Activity Score: 4    Medications and allergies are documented in the EMR and have been reviewed.    Current Outpatient Medications:      amLODIPine (NORVASC) 5 MG tablet, , Disp: , Rfl:      acetaminophen (TYLENOL) 325 MG tablet, Take 3 tablets (975 mg) by mouth every 8 hours Continue for 3 days then change to 650 mg every 4 hours as needed for pain (Patient not taking: Reported on 5/24/2019), Disp: , Rfl:      bisacodyl (DULCOLAX) 10 MG suppository, Place 1 suppository (10 mg) rectally daily as needed for constipation (Patient not taking: Reported on 5/24/2019), Disp: 1 suppository, Rfl: 0     Lidocaine (LIDOCARE) 4 % Patch, Place 1-3 patches onto the skin every 24 hours (Patient not taking: Reported on 5/24/2019), Disp: , Rfl:      methocarbamol (ROBAXIN) 500 MG tablet, Take 1 tablet (500 mg) by mouth 4 times daily as needed for muscle spasms (Patient not taking: Reported on 5/24/2019), Disp: , Rfl:      oxyCODONE (ROXICODONE) 5 MG tablet, Take 1-2 tablets (5-10 mg) by mouth every 4 hours as needed for severe pain (Patient not taking: Reported on 5/24/2019), Disp: 40 tablet, Rfl:  0     polyethylene glycol (MIRALAX/GLYCOLAX) packet, Take 17 g by mouth daily (Patient not taking: Reported on 5/24/2019), Disp: , Rfl:      senna-docusate (SENOKOT-S/PERICOLACE) 8.6-50 MG tablet, Take 1 tablet by mouth 2 times daily (Patient not taking: Reported on 5/24/2019), Disp: , Rfl:      tamsulosin (FLOMAX) 0.4 MG capsule, Take 1 capsule (0.4 mg) by mouth every 24 hours (Patient not taking: Reported on 5/24/2019), Disp: , Rfl:   Allergies: Patient has no known allergies.    Physical Exam:  On physical examination the patient appears the stated age, is in no acute distress, affect is appropriate, and breathing is non-labored.  There were no vitals taken for this visit.  There is no height or weight on file to calculate BMI.  Gait: normal0  Incision: healed   ROM: fluid and painless  Distally, the circulatory, motor, and sensation exam is intact with 5/5 EHL, gastroc-soleus, and tibialis anterior.  Sensation to light touch is intact.  Dorsalis pedis and posterior tibialis pulses are palpable.  There are no sores on the feet, no bruising, and no lymphedema.    X-rays:    I reviewed the x-rays dated today.  Previous films reviewed.    Findings:  Normal progression for a total hip arthroplasty without evidence of loosening or subsidence.    Assessment: doing very well   Plan: continue rehab [facility].  RTC in 6 weeks for a recheck, sooner if issues.     Mariano Valdez    Again, thank you for allowing me to participate in the care of your patient.      Sincerely,    Espinoza Zaragoza MD

## 2019-06-13 NOTE — PROGRESS NOTES
Chief Complaint: Surgical Followup (6 wk post-op total hip arthroplasty DOS 5/1/19  )       HPI: Les Kraus returns today in follow-up for his left total hip. He reportst that he is doing well. He is taking no pain medication and is not using an assist device. Happy with how he is doing.     Current Status:  Results of the patient s Hip Disability and Osteoarthritis Outcome Score (HOOS)  are as follows (0-100 scales with 100 being the theoretical best):  Pain: 90  Symptoms:95   ADLs: 80.88  Sports/Recreation:18.75   Quality of Life: 56.25  (http://koos.nu/)  [facility] Activity Score: 4    Medications and allergies are documented in the EMR and have been reviewed.    Current Outpatient Medications:      amLODIPine (NORVASC) 5 MG tablet, , Disp: , Rfl:      acetaminophen (TYLENOL) 325 MG tablet, Take 3 tablets (975 mg) by mouth every 8 hours Continue for 3 days then change to 650 mg every 4 hours as needed for pain (Patient not taking: Reported on 5/24/2019), Disp: , Rfl:      bisacodyl (DULCOLAX) 10 MG suppository, Place 1 suppository (10 mg) rectally daily as needed for constipation (Patient not taking: Reported on 5/24/2019), Disp: 1 suppository, Rfl: 0     Lidocaine (LIDOCARE) 4 % Patch, Place 1-3 patches onto the skin every 24 hours (Patient not taking: Reported on 5/24/2019), Disp: , Rfl:      methocarbamol (ROBAXIN) 500 MG tablet, Take 1 tablet (500 mg) by mouth 4 times daily as needed for muscle spasms (Patient not taking: Reported on 5/24/2019), Disp: , Rfl:      oxyCODONE (ROXICODONE) 5 MG tablet, Take 1-2 tablets (5-10 mg) by mouth every 4 hours as needed for severe pain (Patient not taking: Reported on 5/24/2019), Disp: 40 tablet, Rfl: 0     polyethylene glycol (MIRALAX/GLYCOLAX) packet, Take 17 g by mouth daily (Patient not taking: Reported on 5/24/2019), Disp: , Rfl:      senna-docusate (SENOKOT-S/PERICOLACE) 8.6-50 MG tablet, Take 1 tablet by mouth 2 times daily (Patient not taking: Reported on  5/24/2019), Disp: , Rfl:      tamsulosin (FLOMAX) 0.4 MG capsule, Take 1 capsule (0.4 mg) by mouth every 24 hours (Patient not taking: Reported on 5/24/2019), Disp: , Rfl:   Allergies: Patient has no known allergies.    Physical Exam:  On physical examination the patient appears the stated age, is in no acute distress, affect is appropriate, and breathing is non-labored.  There were no vitals taken for this visit.  There is no height or weight on file to calculate BMI.  Gait: normal0  Incision: healed   ROM: fluid and painless  Distally, the circulatory, motor, and sensation exam is intact with 5/5 EHL, gastroc-soleus, and tibialis anterior.  Sensation to light touch is intact.  Dorsalis pedis and posterior tibialis pulses are palpable.  There are no sores on the feet, no bruising, and no lymphedema.    X-rays:    I reviewed the x-rays dated today.  Previous films reviewed.    Findings:  Normal progression for a total hip arthroplasty without evidence of loosening or subsidence.    Assessment: doing very well   Plan: continue rehab [facility].  RTC in 6 weeks for a recheck, sooner if issues.     Mariano Valdez

## 2019-06-13 NOTE — NURSING NOTE
Reason For Visit:   Chief Complaint   Patient presents with     Surgical Followup     6 wk post-op total hip arthroplasty DOS 5/1/19         There were no vitals taken for this visit.    Pain Assessment  Patient Currently in Pain: Eladia Godfrey ATC

## 2019-10-02 ENCOUNTER — DOCUMENTATION ONLY (OUTPATIENT)
Dept: CARE COORDINATION | Facility: CLINIC | Age: 74
End: 2019-10-02

## 2019-10-03 ENCOUNTER — OFFICE VISIT (OUTPATIENT)
Dept: ORTHOPEDICS | Facility: CLINIC | Age: 74
End: 2019-10-03
Payer: MEDICARE

## 2019-10-03 VITALS — WEIGHT: 225 LBS | BODY MASS INDEX: 32.21 KG/M2 | HEIGHT: 70 IN

## 2019-10-03 DIAGNOSIS — Z47.89 ORTHOPEDIC AFTERCARE: Primary | ICD-10-CM

## 2019-10-03 ASSESSMENT — ACTIVITIES OF DAILY LIVING (ADL)
ADL_SUM: 6
ADL_MEAN: .35
ADL_SUBSCALE_SCORE: 91.17

## 2019-10-03 ASSESSMENT — MIFFLIN-ST. JEOR: SCORE: 1761.84

## 2019-10-03 NOTE — LETTER
10/3/2019       RE: Les Kraus  [address]  [place] [address]     Dear Colleague,    Thank you for referring your patient, Les Kraus, to the [facility] at [facility]. Please see a copy of my visit note below.    Chief Complaint: RECHECK (3 month follow up MARC DOS 5/1/19)       HPI: Les Kraus returns today in follow-up for his hips. He reports that the hips are both doing well and are pain free. Questions about hip restrictions.   Current Status:  Results of the patient s Hip Disability and Osteoarthritis Outcome Score (HOOS)  are as follows (0-100 scales with 100 being the theoretical best):  Pain: 97.5  Symptoms: 90  ADLs:91.17   Sports/Recreation:68.75   Quality of Life: 50   (http://koos.nu/)  [facility] Activity Score:4      Medications and allergies are documented in the EMR and have been reviewed.    Current Outpatient Medications:      acetaminophen (TYLENOL) 325 MG tablet, Take 3 tablets (975 mg) by mouth every 8 hours Continue for 3 days then change to 650 mg every 4 hours as needed for pain (Patient not taking: Reported on 5/24/2019), Disp: , Rfl:      amLODIPine (NORVASC) 5 MG tablet, , Disp: , Rfl:      bisacodyl (DULCOLAX) 10 MG suppository, Place 1 suppository (10 mg) rectally daily as needed for constipation (Patient not taking: Reported on 5/24/2019), Disp: 1 suppository, Rfl: 0     Lidocaine (LIDOCARE) 4 % Patch, Place 1-3 patches onto the skin every 24 hours (Patient not taking: Reported on 5/24/2019), Disp: , Rfl:      methocarbamol (ROBAXIN) 500 MG tablet, Take 1 tablet (500 mg) by mouth 4 times daily as needed for muscle spasms (Patient not taking: Reported on 5/24/2019), Disp: , Rfl:      oxyCODONE (ROXICODONE) 5 MG tablet, Take 1-2 tablets (5-10 mg) by mouth every 4 hours as needed for severe pain (Patient not taking: Reported on 5/24/2019), Disp: 40 tablet, Rfl: 0     polyethylene glycol (MIRALAX/GLYCOLAX) packet, Take 17 g by  "mouth daily (Patient not taking: Reported on 5/24/2019), Disp: , Rfl:      senna-docusate (SENOKOT-S/PERICOLACE) 8.6-50 MG tablet, Take 1 tablet by mouth 2 times daily (Patient not taking: Reported on 5/24/2019), Disp: , Rfl:      tamsulosin (FLOMAX) 0.4 MG capsule, Take 1 capsule (0.4 mg) by mouth every 24 hours (Patient not taking: Reported on 5/24/2019), Disp: , Rfl:   Allergies: Patient has no known allergies.    Physical Exam:  On physical examination the patient appears the stated age, is in no acute distress, affect is appropriate, and breathing is non-labored.  Ht 1.778 m (5' 10\")   Wt 102.1 kg (225 lb)   BMI 32.28 kg/m     Body mass index is 32.28 kg/m .  Gait: normal   Fluid and painless.   Distally, the circulatory, motor, and sensation exam is intact with 5/5 EHL, gastroc-soleus, and tibialis anterior.  Sensation to light touch is intact.  Dorsalis pedis and posterior tibialis pulses are palpable.  There are no sores on the feet, no bruising, and no lymphedema.    Assessment: doing well. Discussed roll of hip precautions. Appropriate for one year follow-up  Plan: RTC one year, bilateral radiographs at that time. Sooner if issues.     Mariano Valdez      Again, thank you for allowing me to participate in the care of your patient.      Sincerely,    Espinoza Zaragoza MD      "

## 2019-10-03 NOTE — PROGRESS NOTES
Chief Complaint: RECHECK (3 month follow up MARC DOS 5/1/19)       HPI: Les Kraus returns today in follow-up for his hips. He reports that the hips are both doing well and are pain free. Questions about hip restrictions.   Current Status:  Results of the patient s Hip Disability and Osteoarthritis Outcome Score (HOOS)  are as follows (0-100 scales with 100 being the theoretical best):  Pain: 97.5  Symptoms: 90  ADLs:91.17   Sports/Recreation:68.75   Quality of Life: 50   (http://koos.nu/)  UCLA Activity Score:4      Medications and allergies are documented in the EMR and have been reviewed.    Current Outpatient Medications:      acetaminophen (TYLENOL) 325 MG tablet, Take 3 tablets (975 mg) by mouth every 8 hours Continue for 3 days then change to 650 mg every 4 hours as needed for pain (Patient not taking: Reported on 5/24/2019), Disp: , Rfl:      amLODIPine (NORVASC) 5 MG tablet, , Disp: , Rfl:      bisacodyl (DULCOLAX) 10 MG suppository, Place 1 suppository (10 mg) rectally daily as needed for constipation (Patient not taking: Reported on 5/24/2019), Disp: 1 suppository, Rfl: 0     Lidocaine (LIDOCARE) 4 % Patch, Place 1-3 patches onto the skin every 24 hours (Patient not taking: Reported on 5/24/2019), Disp: , Rfl:      methocarbamol (ROBAXIN) 500 MG tablet, Take 1 tablet (500 mg) by mouth 4 times daily as needed for muscle spasms (Patient not taking: Reported on 5/24/2019), Disp: , Rfl:      oxyCODONE (ROXICODONE) 5 MG tablet, Take 1-2 tablets (5-10 mg) by mouth every 4 hours as needed for severe pain (Patient not taking: Reported on 5/24/2019), Disp: 40 tablet, Rfl: 0     polyethylene glycol (MIRALAX/GLYCOLAX) packet, Take 17 g by mouth daily (Patient not taking: Reported on 5/24/2019), Disp: , Rfl:      senna-docusate (SENOKOT-S/PERICOLACE) 8.6-50 MG tablet, Take 1 tablet by mouth 2 times daily (Patient not taking: Reported on 5/24/2019), Disp: , Rfl:      tamsulosin (FLOMAX) 0.4 MG capsule, Take 1  "capsule (0.4 mg) by mouth every 24 hours (Patient not taking: Reported on 5/24/2019), Disp: , Rfl:   Allergies: Patient has no known allergies.    Physical Exam:  On physical examination the patient appears the stated age, is in no acute distress, affect is appropriate, and breathing is non-labored.  Ht 1.778 m (5' 10\")   Wt 102.1 kg (225 lb)   BMI 32.28 kg/m    Body mass index is 32.28 kg/m .  Gait: normal   Fluid and painless.   Distally, the circulatory, motor, and sensation exam is intact with 5/5 EHL, gastroc-soleus, and tibialis anterior.  Sensation to light touch is intact.  Dorsalis pedis and posterior tibialis pulses are palpable.  There are no sores on the feet, no bruising, and no lymphedema.    Assessment: doing well. Discussed roll of hip precautions. Appropriate for one year follow-up  Plan: RTC one year, bilateral radiographs at that time. Sooner if issues.     Mariano Valdez"

## 2019-10-03 NOTE — NURSING NOTE
"Reason For Visit:   Chief Complaint   Patient presents with     RECHECK     3 month follow up MARC DOS 5/1/19       Ht 1.778 m (5' 10\")   Wt 102.1 kg (225 lb)   BMI 32.28 kg/m           Archie Monk ATC  "

## 2019-10-24 ENCOUNTER — RECORDS - HEALTHEAST (OUTPATIENT)
Dept: LAB | Facility: CLINIC | Age: 74
End: 2019-10-24

## 2019-10-24 LAB
ALBUMIN SERPL-MCNC: 4.1 G/DL (ref 3.5–5)
ALBUMIN UR-MCNC: ABNORMAL MG/DL
ALP SERPL-CCNC: 58 U/L (ref 45–120)
ALT SERPL W P-5'-P-CCNC: 19 U/L (ref 0–45)
ANION GAP SERPL CALCULATED.3IONS-SCNC: 9 MMOL/L (ref 5–18)
APPEARANCE UR: CLEAR
AST SERPL W P-5'-P-CCNC: 8 U/L (ref 0–40)
BACTERIA #/AREA URNS HPF: ABNORMAL HPF
BASOPHILS # BLD AUTO: 0.1 THOU/UL (ref 0–0.2)
BASOPHILS NFR BLD AUTO: 1 % (ref 0–2)
BILIRUB SERPL-MCNC: 1.6 MG/DL (ref 0–1)
BILIRUB UR QL STRIP: NEGATIVE
BUN SERPL-MCNC: 19 MG/DL (ref 8–28)
CALCIUM SERPL-MCNC: 10.2 MG/DL (ref 8.5–10.5)
CHLORIDE BLD-SCNC: 108 MMOL/L (ref 98–107)
CHOLEST SERPL-MCNC: 178 MG/DL
CO2 SERPL-SCNC: 23 MMOL/L (ref 22–31)
COLOR UR AUTO: YELLOW
CREAT SERPL-MCNC: 1.17 MG/DL (ref 0.7–1.3)
EOSINOPHIL # BLD AUTO: 0.3 THOU/UL (ref 0–0.4)
EOSINOPHIL NFR BLD AUTO: 3 % (ref 0–6)
ERYTHROCYTE [DISTWIDTH] IN BLOOD BY AUTOMATED COUNT: 13.3 % (ref 11–14.5)
FASTING STATUS PATIENT QL REPORTED: NO
GFR SERPL CREATININE-BSD FRML MDRD: >60 ML/MIN/1.73M2
GLUCOSE BLD-MCNC: 98 MG/DL (ref 70–125)
GLUCOSE UR STRIP-MCNC: NEGATIVE MG/DL
HCT VFR BLD AUTO: 48.5 % (ref 40–54)
HDLC SERPL-MCNC: 29 MG/DL
HGB BLD-MCNC: 15.6 G/DL (ref 14–18)
HGB UR QL STRIP: NEGATIVE
KETONES UR STRIP-MCNC: NEGATIVE MG/DL
LDLC SERPL CALC-MCNC: 103 MG/DL
LEUKOCYTE ESTERASE UR QL STRIP: NEGATIVE
LYMPHOCYTES # BLD AUTO: 0.9 THOU/UL (ref 0.8–4.4)
LYMPHOCYTES NFR BLD AUTO: 12 % (ref 20–40)
MCH RBC QN AUTO: 30.4 PG (ref 27–34)
MCHC RBC AUTO-ENTMCNC: 32.2 G/DL (ref 32–36)
MCV RBC AUTO: 95 FL (ref 80–100)
MONOCYTES # BLD AUTO: 0.7 THOU/UL (ref 0–0.9)
MONOCYTES NFR BLD AUTO: 9 % (ref 2–10)
MUCOUS THREADS #/AREA URNS LPF: ABNORMAL LPF
NEUTROPHILS # BLD AUTO: 5.7 THOU/UL (ref 2–7.7)
NEUTROPHILS NFR BLD AUTO: 74 % (ref 50–70)
NITRATE UR QL: NEGATIVE
PH UR STRIP: 5.5 [PH] (ref 4.5–8)
PLATELET # BLD AUTO: 261 THOU/UL (ref 140–440)
PMV BLD AUTO: 11.2 FL (ref 8.5–12.5)
POTASSIUM BLD-SCNC: 4.3 MMOL/L (ref 3.5–5)
PROT SERPL-MCNC: 7.2 G/DL (ref 6–8)
PSA SERPL-MCNC: 0.7 NG/ML (ref 0–6.5)
RBC # BLD AUTO: 5.13 MILL/UL (ref 4.4–6.2)
RBC #/AREA URNS AUTO: ABNORMAL HPF
SODIUM SERPL-SCNC: 140 MMOL/L (ref 136–145)
SP GR UR STRIP: 1.02 (ref 1–1.03)
SQUAMOUS #/AREA URNS AUTO: ABNORMAL LPF
TRIGL SERPL-MCNC: 232 MG/DL
UROBILINOGEN UR STRIP-ACNC: ABNORMAL
WBC #/AREA URNS AUTO: ABNORMAL HPF
WBC: 7.7 THOU/UL (ref 4–11)

## 2020-02-27 ENCOUNTER — TELEPHONE (OUTPATIENT)
Dept: ORTHOPEDICS | Facility: CLINIC | Age: 75
End: 2020-02-27

## 2020-02-27 NOTE — TELEPHONE ENCOUNTER
[facility] Call Center    Phone Message    May a detailed message be left on voicemail: yes     Reason for Call: Other: pt is going to [employer], and is wondering what exercises and can/cannot do, he currently has a , and feels like some of the work outs are appropriate, please call pt thanks     Action Taken: Message routed to:  [facility] ([facility] ortho    Travel Screening: Not Applicable

## 2020-02-27 NOTE — TELEPHONE ENCOUNTER
Patient was called back and he stated that he has been doing dead lifts with with .  He is doing 3 sets of 12 at 100 pounds.  He stated that he has some right hip pain.  He was told to only do the exercise if he feels comfortable and to follow the restriction of not bending his hip past 90 degrees.  He was agreeable with this plan and will let us know if he has any other concerns.

## 2020-09-20 NOTE — PLAN OF CARE
VS: A&Ox4. VSS. Denies SOB, chest pain, dizziness, lightheadedness, numbness, tingling, nausea, and vomiting.    O2: >90% RA. Lung sounds clear bilaterally. IS encouraged. Uses CPAP at bedtime.    Output: Voiding spontaneously without difficulty.    Last BM: Bowel sounds active passing gas, and last BM 4/30. Pt declined suppository. Encouraged fluids and ambulation.    Activity: A1 with walker, WBAT on LLE, on hip precautions.    Up for meals? Up for meals.    Skin: Intact with exception to incision.    Pain: Reports left hip pain. Has oxycodone 5-10 mg Q4H. Ice pack applied.    CMS: Intact.    Dressing: CDI.    Diet: Regular diet and tolerating well.    LDA: No PIV access.    Equipment: Walker, PCDs, CPAP, personal belongings at bedside.    Plan: [facility] 5/4. Ride from [facility].    Additional Info: Patient able to make needs known. Will continue to monitor.         Universal Safety Interventions

## 2020-11-13 ENCOUNTER — RECORDS - HEALTHEAST (OUTPATIENT)
Dept: LAB | Facility: CLINIC | Age: 75
End: 2020-11-13

## 2020-11-13 LAB
ALBUMIN SERPL-MCNC: 4 G/DL (ref 3.5–5)
ALBUMIN UR-MCNC: NEGATIVE MG/DL
ALP SERPL-CCNC: 51 U/L (ref 45–120)
ALT SERPL W P-5'-P-CCNC: 29 U/L (ref 0–45)
ANION GAP SERPL CALCULATED.3IONS-SCNC: 7 MMOL/L (ref 5–18)
APPEARANCE UR: CLEAR
AST SERPL W P-5'-P-CCNC: 11 U/L (ref 0–40)
BACTERIA #/AREA URNS HPF: ABNORMAL HPF
BASOPHILS # BLD AUTO: 0.1 THOU/UL (ref 0–0.2)
BASOPHILS NFR BLD AUTO: 1 % (ref 0–2)
BILIRUB SERPL-MCNC: 0.9 MG/DL (ref 0–1)
BILIRUB UR QL STRIP: NEGATIVE
BUN SERPL-MCNC: 20 MG/DL (ref 8–28)
CALCIUM SERPL-MCNC: 9.8 MG/DL (ref 8.5–10.5)
CHLORIDE BLD-SCNC: 104 MMOL/L (ref 98–107)
CHOLEST SERPL-MCNC: 185 MG/DL
CO2 SERPL-SCNC: 29 MMOL/L (ref 22–31)
COLOR UR AUTO: YELLOW
CREAT SERPL-MCNC: 1.18 MG/DL (ref 0.7–1.3)
EOSINOPHIL # BLD AUTO: 0.2 THOU/UL (ref 0–0.4)
EOSINOPHIL NFR BLD AUTO: 4 % (ref 0–6)
ERYTHROCYTE [DISTWIDTH] IN BLOOD BY AUTOMATED COUNT: 12.7 % (ref 11–14.5)
FASTING STATUS PATIENT QL REPORTED: YES
GFR SERPL CREATININE-BSD FRML MDRD: 60 ML/MIN/1.73M2
GLUCOSE BLD-MCNC: 84 MG/DL (ref 70–125)
GLUCOSE UR STRIP-MCNC: NEGATIVE MG/DL
HCT VFR BLD AUTO: 46.4 % (ref 40–54)
HDLC SERPL-MCNC: 25 MG/DL
HGB BLD-MCNC: 15.4 G/DL (ref 14–18)
HGB UR QL STRIP: NEGATIVE
IMM GRANULOCYTES # BLD: 0 THOU/UL
IMM GRANULOCYTES NFR BLD: 1 %
KETONES UR STRIP-MCNC: NEGATIVE MG/DL
LDLC SERPL CALC-MCNC: ABNORMAL MG/DL
LEUKOCYTE ESTERASE UR QL STRIP: ABNORMAL
LYMPHOCYTES # BLD AUTO: 1.2 THOU/UL (ref 0.8–4.4)
LYMPHOCYTES NFR BLD AUTO: 19 % (ref 20–40)
MCH RBC QN AUTO: 31.2 PG (ref 27–34)
MCHC RBC AUTO-ENTMCNC: 33.2 G/DL (ref 32–36)
MCV RBC AUTO: 94 FL (ref 80–100)
MONOCYTES # BLD AUTO: 0.8 THOU/UL (ref 0–0.9)
MONOCYTES NFR BLD AUTO: 12 % (ref 2–10)
NEUTROPHILS # BLD AUTO: 4.1 THOU/UL (ref 2–7.7)
NEUTROPHILS NFR BLD AUTO: 64 % (ref 50–70)
NITRATE UR QL: NEGATIVE
PH UR STRIP: 7.5 [PH] (ref 4.5–8)
PLATELET # BLD AUTO: 266 THOU/UL (ref 140–440)
PMV BLD AUTO: 10.4 FL (ref 8.5–12.5)
POTASSIUM BLD-SCNC: 4.1 MMOL/L (ref 3.5–5)
PROT SERPL-MCNC: 7 G/DL (ref 6–8)
PSA SERPL-MCNC: 0.5 NG/ML (ref 0–6.5)
RBC # BLD AUTO: 4.93 MILL/UL (ref 4.4–6.2)
RBC #/AREA URNS AUTO: ABNORMAL HPF
SODIUM SERPL-SCNC: 140 MMOL/L (ref 136–145)
SP GR UR STRIP: 1.01 (ref 1–1.03)
SQUAMOUS #/AREA URNS AUTO: ABNORMAL LPF
TRIGL SERPL-MCNC: 442 MG/DL
UROBILINOGEN UR STRIP-ACNC: ABNORMAL
WBC #/AREA URNS AUTO: ABNORMAL HPF
WBC: 6.4 THOU/UL (ref 4–11)

## 2020-11-14 LAB — BACTERIA SPEC CULT: NO GROWTH

## 2021-02-07 ENCOUNTER — HEALTH MAINTENANCE LETTER (OUTPATIENT)
Age: 76
End: 2021-02-07

## 2021-05-28 NOTE — PROGRESS NOTES
"[facility]    Name:   Les Kraus  : 1945  Facility:   [facility] [[id number]]   Room:   Code Status: FULL CODE -   Fac type:   SNF (Skilled Nursing Facility, TCU) -     CHIEF COMPLAINT / REASON FOR VISIT:  Chief Complaint   Patient presents with     Follow-up     TCU follow-up after hospitalization for left MARC.     [facility]) from 2019 until 2019 (left MARC)       HPI: Les is a 73 y.o. male with a history of hypertension, obstructive sleep apnea (CPAP), osteoarthritis (history of right MARC in 2016), and a history of anaphylaxis, who underwent a left total hip arthroplasty on 2019.  There were no perioperative complications, and he was treated prophylactically with Ancef.    Orders from Ortho called for 2 weeks of enoxaparin followed by 2 weeks of aspirin.  He appears to have some history of urinary retention (BPH); however, he is not yet on Flomax.    History of anaphylaxis: Occurred approximately 10 to 15 years ago after eating pizza and drinking red wine, been going on a long walk.  Evaluated by an allergist who could not determine cause.  He has an EpiPen which he has never used.  No further recurrences, though he typically exercises on an empty stomach.      CURRENT ISSUES    No real issues.  He has not yet been seen by physical therapy.  He does have orders for CPAP but no directions.    ROS:   Pain is minimal, except with movement.  He was given oxycodone in the hospital, but he \"gave that up.\"  He was also receiving acetaminophen, and that was stopped as well.  No headaches or chest pains, coughing or congestion, nausea or vomiting, dizziness or dyspnea, dysuria, constipation or diarrhea, difficulty chewing or swallowing, integumentary issues, or problems with appetite or sleep.    Past Medical History:   Diagnosis Date     Acute blood loss anemia      BPH (benign prostatic hyperplasia)      Constipation      " Depression      Exercise induced anaphylaxis     After eating certain foods. No issues for several years. Allergist could not determine cause.     HTN (hypertension)      Osteoarthritis of right hip      Sleep apnea               Family History   Problem Relation Age of Onset     Alzheimer's disease Mother      Coronary aneurysm Father      Coronary artery disease Father      Social History     Socioeconomic History     Marital status:      Spouse name: Not on file     Number of children: Not on file     Years of education: Not on file     Highest education level: Not on file   Occupational History     Not on file   Social Needs     Financial resource strain: Not on file     Food insecurity:     Worry: Not on file     Inability: Not on file     Transportation needs:     Medical: Not on file     Non-medical: Not on file   Tobacco Use     Smoking status: Never Smoker     Smokeless tobacco: Never Used   Substance and Sexual Activity     Alcohol use: Yes     Alcohol/week: 4.2 oz     Types: 7 Glasses of wine per week     Comment: 1 glass of red wine per night     Drug use: Not on file     Sexual activity: Not on file   Lifestyle     Physical activity:     Days per week: Not on file     Minutes per session: Not on file     Stress: Not on file   Relationships     Social connections:     Talks on phone: Not on file     Gets together: Not on file     Attends Cheondoism service: Not on file     Active member of club or organization: Not on file     Attends meetings of clubs or organizations: Not on file     Relationship status: Not on file     Intimate partner violence:     Fear of current or ex partner: Not on file     Emotionally abused: Not on file     Physically abused: Not on file     Forced sexual activity: Not on file   Other Topics Concern     Not on file   Social History Narrative     Not on file       MEDICATIONS: Reviewed from the MAR, physician orders, and/or earlier progress notes.    ALLERGIES: No Known  "Allergies    DIET: Regular, regular texture, thin liquids.    Vitals:    05/06/19 1513   BP: 139/82   Pulse: 80   Resp: 18   Temp: 98.1  F (36.7  C)   SpO2: 97%   Weight: (!) 237 lb 3.2 oz (107.6 kg)   Height: 5' 9\" (1.753 m)     Body mass index is 35.03 kg/m .    EXAMINATION:   General: Pleasant, alert, conversant middle-aged male, sitting on his bed, in no apparent distress.  Head: Normocephalic and atraumatic.   Eyes: PERRLA, sclerae clear.   ENT: Moist oral mucosa.  He has his own teeth.  No rhinorrhea or nasal discharge.  Hearing is unimpaired.  Cardiovascular: Regular rate and rhythm.  No appreciable murmur.  Respiratory: Lungs clear to auscultation bilaterally.   Abdomen: Soft and nontender.   Musculoskeletal/Extremities: No peripheral edema.  Integument: No rashes, clinically significant lesions, or skin breakdown.   Cognitive/Psychiatric: Alert and oriented x3.  Affect is euthymic.    DIAGNOSTICS:   Results for orders placed or performed in visit on 05/06/19   Basic Metabolic Panel   Result Value Ref Range    Sodium 140 136 - 145 mmol/L    Potassium 4.0 3.5 - 5.0 mmol/L    Chloride 106 98 - 107 mmol/L    CO2 25 22 - 31 mmol/L    Anion Gap, Calculation 9 5 - 18 mmol/L    Glucose 76 70 - 125 mg/dL    Calcium 9.8 8.5 - 10.5 mg/dL    BUN 19 8 - 28 mg/dL    Creatinine 0.97 0.70 - 1.30 mg/dL    GFR MDRD Af Amer >60 >60 mL/min/1.73m2    GFR MDRD Non Af Amer >60 >60 mL/min/1.73m2     Lab Results   Component Value Date    WBC 7.7 05/06/2019    HGB 13.4 (L) 05/06/2019    HCT 41.1 05/06/2019    MCV 94 05/06/2019     05/06/2019     Estimated Creatinine Clearance: 80.8 mL/min (by C-G formula based on SCr of 0.97 mg/dL).      ASSESSMENT/Plan:      ICD-10-CM    1. H/O total hip arthroplasty, left (2019) Z96.642    2. H/O total right hip replacement (2016) Z96.641    3. Essential hypertension I10    4. Obstructive sleep apnea syndrome G47.33      CHANGES:    None.    CARE PLAN:    The care plan has been reviewed and " all orders signed. Changes to care plan, if any, as noted. Otherwise, continue current plan of care.    The above has been created using voice recognition software. Please be aware that this may unintentionally  produce inaccuracies and/or nonsensical sentences.      Electronically signed by: Carlo Manuel CNP

## 2021-05-28 NOTE — PROGRESS NOTES
[facility]    Name:   Les Kraus  : 1945  Facility:   [facility] [[id number]]   Room:   Code Status: FULL CODE -   Fac type:   SNF (Skilled Nursing Facility, TCU) -     CHIEF COMPLAINT / REASON FOR VISIT:  Chief Complaint   Patient presents with     Discharge Summary     TCU discharge after hospitalization for left MARC.     [facility]) from 2019 until 2019 (left MARC)   [facility]CU from 2019 until 05/15/2019      HPI: Les is a 73 y.o. male with a history of hypertension, obstructive sleep apnea (CPAP), osteoarthritis (history of right MARC in 2016), and a history of anaphylaxis, who underwent a left total hip arthroplasty on 2019.  There were no perioperative complications, and he was treated prophylactically with Ancef.    Orders from Ortho called for 2 weeks of enoxaparin followed by 2 weeks of aspirin.     History of anaphylaxis: Occurred approximately 10 to 15 years ago after eating pizza and drinking red wine, been going on a long walk.  Evaluated by an allergist who could not determine cause.  He has an EpiPen which he has never used.  No further recurrences, though he typically exercises on an empty stomach.      TCU ISSUES    Pain: Pain has been minimal.  He was given oxycodone in the hospital, but did not receive any here.  In fact, he did not even ask for acetaminophen.  We are discontinuing the oxycodone.    Urinary retention: He has been on tamsulosin but does not want to take this.  He denies having any issues with BPH.  He does say that he has always had problems coming off anesthesia, experiencing urinary retention as result, but this soon resolved.  We are discontinuing his tamsulosin.    Constipation: This is not been an issue, and he has been refusing his senna S and has not needed either MiraLAX or bisacodyl suppositories.  We are discontinuing those as well.    Discharge planning: He will  require home PT to continue to progress with a cane in the home setting.  Discharge date is expected to be 05/15/2019.    ROS:   No headaches or chest pains, coughing or congestion, nausea or vomiting, dizziness or dyspnea, dysuria, constipation or diarrhea, difficulty chewing or swallowing, integumentary issues, or problems with appetite or sleep.    Past Medical History:   Diagnosis Date     Acute blood loss anemia      BPH (benign prostatic hyperplasia)      Constipation      Depression      Exercise induced anaphylaxis     After eating certain foods. No issues for several years. Allergist could not determine cause.     HTN (hypertension)      Osteoarthritis of right hip      Sleep apnea               Family History   Problem Relation Age of Onset     Alzheimer's disease Mother      Coronary aneurysm Father      Coronary artery disease Father      Social History     Socioeconomic History     Marital status:      Spouse name: Not on file     Number of children: Not on file     Years of education: Not on file     Highest education level: Not on file   Occupational History     Not on file   Social Needs     Financial resource strain: Not on file     Food insecurity:     Worry: Not on file     Inability: Not on file     Transportation needs:     Medical: Not on file     Non-medical: Not on file   Tobacco Use     Smoking status: Never Smoker     Smokeless tobacco: Never Used   Substance and Sexual Activity     Alcohol use: Yes     Alcohol/week: 4.2 oz     Types: 7 Glasses of wine per week     Comment: 1 glass of red wine per night     Drug use: Not on file     Sexual activity: Not on file   Lifestyle     Physical activity:     Days per week: Not on file     Minutes per session: Not on file     Stress: Not on file   Relationships     Social connections:     Talks on phone: Not on file     Gets together: Not on file     Attends Confucianism service: Not on file     Active member of club or organization: Not on file  "    Attends meetings of clubs or organizations: Not on file     Relationship status: Not on file     Intimate partner violence:     Fear of current or ex partner: Not on file     Emotionally abused: Not on file     Physically abused: Not on file     Forced sexual activity: Not on file   Other Topics Concern     Not on file   Social History Narrative     Not on file       MEDICATIONS: Reviewed from the MAR, physician orders, and/or earlier progress notes.  Updated by me today (05/13/2019) with discontinue of several medications as noted above reflected below.  Current Outpatient Medications   Medication Sig     acetaminophen (TYLENOL) 325 MG tablet Take 650 mg by mouth every 4 (four) hours as needed for pain or fever.     [START ON 5/16/2019] amLODIPine (NORVASC) 2.5 MG tablet Take 5 mg by mouth daily. Hold for SBP <130           [START ON 5/16/2019] aspirin 325 MG EC tablet Take 325 mg by mouth daily. For 14 days           enoxaparin (LOVENOX) 40 mg/0.4 mL syringe Inject 40 mg under the skin daily. For 11 days           ALLERGIES: No Known Allergies    DIET: Regular, regular texture, thin liquids.    Vitals:    05/13/19 1441   BP: 112/76   Pulse: 90   Resp: 16   Temp: 98.8  F (37.1  C)   SpO2: 98%   Weight: (!) 256 lb 9.6 oz (116.4 kg)   Height: 5' 9\" (1.753 m)     Body mass index is 37.89 kg/m .    EXAMINATION:   General: Pleasant, alert, conversant middle-aged male, sitting in a recliner, in no apparent distress.  Head: Normocephalic and atraumatic.   Eyes: PERRLA, sclerae clear.   ENT: Moist oral mucosa.  He has his own teeth.  No rhinorrhea or nasal discharge.  Hearing is unimpaired.  Cardiovascular: Regular rate and rhythm with possibly a 1/6 BRENDAN at LSB.  Respiratory: Lungs clear to auscultation bilaterally.   Abdomen: Soft and nontender.   Musculoskeletal/Extremities: No peripheral edema.  Integument: No rashes, clinically significant lesions, or skin breakdown.   Cognitive/Psychiatric: Alert and oriented x3.  " Affect is euthymic.    DIAGNOSTICS:   Results for orders placed or performed in visit on 05/06/19   Basic Metabolic Panel   Result Value Ref Range    Sodium 140 136 - 145 mmol/L    Potassium 4.0 3.5 - 5.0 mmol/L    Chloride 106 98 - 107 mmol/L    CO2 25 22 - 31 mmol/L    Anion Gap, Calculation 9 5 - 18 mmol/L    Glucose 76 70 - 125 mg/dL    Calcium 9.8 8.5 - 10.5 mg/dL    BUN 19 8 - 28 mg/dL    Creatinine 0.97 0.70 - 1.30 mg/dL    GFR MDRD Af Amer >60 >60 mL/min/1.73m2    GFR MDRD Non Af Amer >60 >60 mL/min/1.73m2     Lab Results   Component Value Date    WBC 7.7 05/06/2019    HGB 13.4 (L) 05/06/2019    HCT 41.1 05/06/2019    MCV 94 05/06/2019     05/06/2019     CrCl cannot be calculated (Patient's most recent lab result is older than the maximum 5 days allowed.).      ASSESSMENT/Plan:      ICD-10-CM    1. H/O total hip arthroplasty, left Z96.642    2. History of total right hip replacement Z96.641    3. Essential hypertension I10    4. Obstructive sleep apnea syndrome G47.33      DISCHARGE PLAN/FACE TO FACE:  I certify that this patient is under my care and that I had a face-to-face encounter that meets the physician face-to-face encounter requirements with this patient.     Date of Face-to-Face Encounter: 05/13/2019     I certify that, based on my findings, the following services are medically necessary home health services: Home PT    My clinical findings support the need for the above skilled services because: (Please write a brief narrative summary that describes what the RN, PT, SLP, or other services will be doing in the home. A list of diagnoses in this section does not meet the CMS requirements): Home PT to continue therapy/progress with a cane in the home setting.    This patient is homebound because: (Please write a brief narrative summmary describing the functional limitations as to why this patient is homebound and specifically what makes this patient homebound.):  Above services require being  performed in the home to be of benefit.    The patient is, or has been, under my care and I have initiated the establishment of the plan of care. This patient will be followed by a physician who will periodically review the plan of care.  Initial follow-up should be within 7-10 days.    Approximate time spent with this patient was greater than 30 minutes with greater than 50% spent in discussions regarding services required upon discharge.    The above has been created using voice recognition software. Please be aware that this may unintentionally  produce inaccuracies and/or nonsensical sentences.      Electronically signed by: Carlo Manuel CNP

## 2021-05-28 NOTE — PROGRESS NOTES
[facility]      Facility:    [facility] [[id number]]    Code Status: FULL CODE      Chief Complaint/Reason for Visit:  Chief Complaint   Patient presents with     H & P     Admission after an elective left total hip arthroplasty       HPI:   Les is a 73 y.o. male with a medical history of depression, hypertension, osteoarthritis, sleep apnea and BPH.    Had a right total hip arthroplasty. He had progressive issues with his left hip pain.  He had a left total hip arthroplasty on 5-1-2019.  He had some loose saline postoperatively.  Enoxaparin was planned for DVT prophylaxis x2 weeks, followed by 2 weeks of aspirin.    He transferred to [facility] for OT and PT.      Past Medical History:  Past Medical History:   Diagnosis Date     Acute blood loss anemia      BPH (benign prostatic hyperplasia)      Constipation      Depression      Exercise induced anaphylaxis     After eating certain foods. No issues for several years. Allergist could not determine cause.     HTN (hypertension)      Osteoarthritis of right hip      Sleep apnea            Surgical History:  Past Surgical History:   Procedure Laterality Date     COLONOSCOPY       HERNIA REPAIR       HIP ARTHROPLASTY Right 08/17/2016     TOOTH EXTRACTION      Anthony Teeth Removal       TOTAL HIP ARTHROPLASTY Left 05/01/2019       Family History:   Family History   Problem Relation Age of Onset     Alzheimer's disease Mother      Coronary aneurysm Father      Coronary artery disease Father     + Osteoarthritis in his mother : in her hips    Social History:    Social History     Socioeconomic History     Marital status:      Spouse name: Not on file     Number of children: Not on file     Years of education: Not on file     Highest education level: Not on file   Occupational History     Not on file   Social Needs     Financial resource strain: Not on file     Food insecurity:     Worry: Not on file      Inability: Not on file     Transportation needs:     Medical: Not on file     Non-medical: Not on file   Tobacco Use     Smoking status: Never Smoker     Smokeless tobacco: Never Used   Substance and Sexual Activity     Alcohol use: Yes     Alcohol/week: 4.2 oz     Types: 7 Glasses of wine per week     Comment: 1 glass of red wine per night     Drug use: Not on file     Sexual activity: Not on file   Lifestyle     Physical activity:     Days per week: Not on file     Minutes per session: Not on file     Stress: Not on file   Relationships     Social connections:     Talks on phone: Not on file     Gets together: Not on file     Attends Mosque service: Not on file     Active member of club or organization: Not on file     Attends meetings of clubs or organizations: Not on file     Relationship status: Not on file     Intimate partner violence:     Fear of current or ex partner: Not on file     Emotionally abused: Not on file     Physically abused: Not on file     Forced sexual activity: Not on file   Other Topics Concern     Not on file   Social History Narrative     Not on file          Review of Systems   He has minimal if any pain.  He was initially constipated for 3 days postoperatively, but that has straightened around he feels fine now.  He lives in a house alone, is looking forward to being active once he heals from the current surgery.  The remainder of the comprehensive review of systems is negative.    Vitals:    05/10/19 0830   BP: 149/90   Pulse: 70   Resp: 20   Temp: 98.1  F (36.7  C)   SpO2: 97%   Weight: (!) 256 lb 9.6 oz (116.4 kg)       Physical Exam   Constitutional: He is oriented to person, place, and time. He appears well-nourished. No distress.   HENT:   Nose: Nose normal.   Mouth/Throat: Oropharynx is clear and moist.   Eyes: Conjunctivae and EOM are normal.   Cardiovascular: Regular rhythm and normal heart sounds.   No murmur heard.  Pulmonary/Chest: Breath sounds normal. He has no wheezes.  He has no rales.   Abdominal: Soft. Bowel sounds are normal. There is no tenderness. There is no rebound.   Musculoskeletal: Normal range of motion. He exhibits tenderness. He exhibits no edema.   Minimal tenderness around the incision which is clean and dry, no erythema   Lymphadenopathy:     He has no cervical adenopathy.   Neurological: He is alert and oriented to person, place, and time.   Skin: Skin is warm and dry. No rash noted.   Psychiatric: He has a normal mood and affect. Thought content normal.       Medication List:  Current Outpatient Medications   Medication Sig     acetaminophen (TYLENOL) 325 MG tablet Take 650 mg by mouth every 4 (four) hours as needed for pain or fever.     [START ON 5/16/2019] amLODIPine (NORVASC) 2.5 MG tablet Take 5 mg by mouth daily. Hold for SBP <130           [START ON 5/16/2019] aspirin 325 MG EC tablet Take 325 mg by mouth daily. For 14 days           enoxaparin (LOVENOX) 40 mg/0.4 mL syringe Inject 40 mg under the skin daily. For 11 days             Labs:  Results for orders placed or performed in visit on 05/06/19   Basic Metabolic Panel   Result Value Ref Range     Sodium 140 136 - 145 mmol/L     Potassium 4.0 3.5 - 5.0 mmol/L     Chloride 106 98 - 107 mmol/L     CO2 25 22 - 31 mmol/L     Anion Gap, Calculation 9 5 - 18 mmol/L     Glucose 76 70 - 125 mg/dL     Calcium 9.8 8.5 - 10.5 mg/dL     BUN 19 8 - 28 mg/dL     Creatinine 0.97 0.70 - 1.30 mg/dL     GFR MDRD Af Amer >60 >60 mL/min/1.73m2     GFR MDRD Non Af Amer >60 >60 mL/min/1.73m2            Lab Results   Component Value Date     WBC 7.7 05/06/2019     HGB 13.4 (L) 05/06/2019     HCT 41.1 05/06/2019     MCV 94 05/06/2019      05/06/2019       Assessment  /  Plan:    ICD-10-CM    1. Status post total replacement of left hip Z96.642  doing very well in therapy, minimal pain   2. Essential hypertension I10  days blood pressure reading is high, reviewing all of the blood pressures taken at the facility, this is  the only one with a slightly high systolic and diastolic.   3. Obstructive sleep apnea syndrome G47.33  Has his CPAP machine here at the TCU.       Electronically signed by: Eloisa Flannery MD

## 2021-05-28 NOTE — PROGRESS NOTES
"[facility]    Name:   Les Kraus  : 1945  Facility:   [facility] [[id number]]   Room:   Code Status: FULL CODE -   Fac type:   SNF (Skilled Nursing Facility, TCU) -     CHIEF COMPLAINT / REASON FOR VISIT:  Chief Complaint   Patient presents with     Follow-up     TCU follow-up after hospitalization for left MARC.     [facility]) from 2019 until 2019 (left MARC)     Patient was last seen by me on 2019.      HPI: Les is a 73 y.o. male with a history of hypertension, obstructive sleep apnea (CPAP), osteoarthritis (history of right MARC in 2016), and a history of anaphylaxis, who underwent a left total hip arthroplasty on 2019.  There were no perioperative complications, and he was treated prophylactically with Ancef.    Orders from Ortho called for 2 weeks of enoxaparin followed by 2 weeks of aspirin.  He appears to have some history of urinary retention (BPH); however, he is not yet on Flomax.    History of anaphylaxis: Occurred approximately 10 to 15 years ago after eating pizza and drinking red wine, been going on a long walk.  Evaluated by an allergist who could not determine cause.  He has an EpiPen which he has never used.  No further recurrences, though he typically exercises on an empty stomach.      CURRENT ISSUES    No real issues.  He has not yet been seen by physical therapy.  He does have orders for CPAP but no directions.  He is up, walking, and telling me he feels \"pretty good.\"    ROS:   Pain is minimal, except with movement.  He was given oxycodone in the hospital, but has not had any here.  In fact, he does not even ask for acetaminophen. No headaches or chest pains, coughing or congestion, nausea or vomiting, dizziness or dyspnea, dysuria, constipation or diarrhea, difficulty chewing or swallowing, integumentary issues, or problems with appetite or sleep.    Past Medical History:   Diagnosis Date     Acute " blood loss anemia      BPH (benign prostatic hyperplasia)      Constipation      Depression      Exercise induced anaphylaxis     After eating certain foods. No issues for several years. Allergist could not determine cause.     HTN (hypertension)      Osteoarthritis of right hip      Sleep apnea               Family History   Problem Relation Age of Onset     Alzheimer's disease Mother      Coronary aneurysm Father      Coronary artery disease Father      Social History     Socioeconomic History     Marital status:      Spouse name: Not on file     Number of children: Not on file     Years of education: Not on file     Highest education level: Not on file   Occupational History     Not on file   Social Needs     Financial resource strain: Not on file     Food insecurity:     Worry: Not on file     Inability: Not on file     Transportation needs:     Medical: Not on file     Non-medical: Not on file   Tobacco Use     Smoking status: Never Smoker     Smokeless tobacco: Never Used   Substance and Sexual Activity     Alcohol use: Yes     Alcohol/week: 4.2 oz     Types: 7 Glasses of wine per week     Comment: 1 glass of red wine per night     Drug use: Not on file     Sexual activity: Not on file   Lifestyle     Physical activity:     Days per week: Not on file     Minutes per session: Not on file     Stress: Not on file   Relationships     Social connections:     Talks on phone: Not on file     Gets together: Not on file     Attends Episcopal service: Not on file     Active member of club or organization: Not on file     Attends meetings of clubs or organizations: Not on file     Relationship status: Not on file     Intimate partner violence:     Fear of current or ex partner: Not on file     Emotionally abused: Not on file     Physically abused: Not on file     Forced sexual activity: Not on file   Other Topics Concern     Not on file   Social History Narrative     Not on file       MEDICATIONS: Reviewed from  "the MAR, physician orders, and/or earlier progress notes.    ALLERGIES: No Known Allergies    DIET: Regular, regular texture, thin liquids.    Vitals:    05/08/19 1544   BP: 118/70   Pulse: 82   Resp: 18   Temp: 97.7  F (36.5  C)   SpO2: 97%   Weight: (!) 237 lb 3.2 oz (107.6 kg)   Height: 5' 9\" (1.753 m)     Body mass index is 35.03 kg/m .    EXAMINATION:   General: Pleasant, alert, conversant middle-aged male, sitting in a recliner, in no apparent distress.  Head: Normocephalic and atraumatic.   Eyes: PERRLA, sclerae clear.   ENT: Moist oral mucosa.  He has his own teeth.  No rhinorrhea or nasal discharge.  Hearing is unimpaired.  Cardiovascular: Regular rate and rhythm with possibly a 1/6 BRENDAN at LSB.  Respiratory: Lungs clear to auscultation bilaterally.   Abdomen: Soft and nontender.   Musculoskeletal/Extremities: No peripheral edema.  Integument: No rashes, clinically significant lesions, or skin breakdown.   Cognitive/Psychiatric: Alert and oriented x3.  Affect is euthymic.    DIAGNOSTICS:   Results for orders placed or performed in visit on 05/06/19   Basic Metabolic Panel   Result Value Ref Range    Sodium 140 136 - 145 mmol/L    Potassium 4.0 3.5 - 5.0 mmol/L    Chloride 106 98 - 107 mmol/L    CO2 25 22 - 31 mmol/L    Anion Gap, Calculation 9 5 - 18 mmol/L    Glucose 76 70 - 125 mg/dL    Calcium 9.8 8.5 - 10.5 mg/dL    BUN 19 8 - 28 mg/dL    Creatinine 0.97 0.70 - 1.30 mg/dL    GFR MDRD Af Amer >60 >60 mL/min/1.73m2    GFR MDRD Non Af Amer >60 >60 mL/min/1.73m2     Lab Results   Component Value Date    WBC 7.7 05/06/2019    HGB 13.4 (L) 05/06/2019    HCT 41.1 05/06/2019    MCV 94 05/06/2019     05/06/2019     Estimated Creatinine Clearance: 80.8 mL/min (by C-G formula based on SCr of 0.97 mg/dL).      ASSESSMENT/Plan:      ICD-10-CM    1. H/O total hip arthroplasty, left Z96.642    2. History of total right hip replacement Z96.641    3. Essential hypertension I10    4. Obstructive sleep apnea " syndrome G47.33      CHANGES:    None.    CARE PLAN:    The care plan has been reviewed and all orders signed. Changes to care plan, if any, as noted. Otherwise, continue current plan of care.    The above has been created using voice recognition software. Please be aware that this may unintentionally  produce inaccuracies and/or nonsensical sentences.      Electronically signed by: Carlo Manuel CNP

## 2021-06-01 ENCOUNTER — RECORDS - HEALTHEAST (OUTPATIENT)
Dept: ADMINISTRATIVE | Facility: CLINIC | Age: 76
End: 2021-06-01

## 2021-06-03 VITALS — HEIGHT: 69 IN | BODY MASS INDEX: 35.13 KG/M2 | WEIGHT: 237.2 LBS

## 2021-06-03 VITALS — BODY MASS INDEX: 38 KG/M2 | WEIGHT: 256.6 LBS | HEIGHT: 69 IN

## 2021-06-03 VITALS — BODY MASS INDEX: 37.89 KG/M2 | WEIGHT: 256.6 LBS

## 2021-06-19 NOTE — LETTER
Letter by Eloisa Flannery MD at      Author: Eloisa Flannery MD Service: -- Author Type: --    Filed:  Encounter Date: 5/10/2019 Status: (Other)         Patient: Les Kraus   MR Number: [id number]   YOB: 1945   Date of Visit: 5/10/2019     [facility]      Facility:    [facility] [[id number]]    Code Status: FULL CODE      Chief Complaint/Reason for Visit:  Chief Complaint   Patient presents with   ? H & P     Admission after an elective left total hip arthroplasty       HPI:   Les is a 73 y.o. male with a medical history of depression, hypertension, osteoarthritis, sleep apnea and BPH.    Had a right total hip arthroplasty. He had progressive issues with his left hip pain.  He had a left total hip arthroplasty on 5-1-2019.  He had some loose saline postoperatively.  Enoxaparin was planned for DVT prophylaxis x2 weeks, followed by 2 weeks of aspirin.    He transferred to [facility] for OT and PT.      Past Medical History:  Past Medical History:   Diagnosis Date   ? Acute blood loss anemia    ? BPH (benign prostatic hyperplasia)    ? Constipation    ? Depression    ? Exercise induced anaphylaxis     After eating certain foods. No issues for several years. Allergist could not determine cause.   ? HTN (hypertension)    ? Osteoarthritis of right hip    ? Sleep apnea            Surgical History:  Past Surgical History:   Procedure Laterality Date   ? COLONOSCOPY     ? HERNIA REPAIR     ? HIP ARTHROPLASTY Right 08/17/2016   ? TOOTH EXTRACTION      [place] Teeth Removal     ? TOTAL HIP ARTHROPLASTY Left 05/01/2019       Family History:   Family History   Problem Relation Age of Onset   ? Alzheimer's disease Mother    ? Coronary aneurysm Father    ? Coronary artery disease Father     + Osteoarthritis in his mother : in her hips    Social History:    Social History     Socioeconomic History   ? Marital status:      Spouse name: Not on file    ? Number of children: Not on file   ? Years of education: Not on file   ? Highest education level: Not on file   Occupational History   ? Not on file   Social Needs   ? Financial resource strain: Not on file   ? Food insecurity:     Worry: Not on file     Inability: Not on file   ? Transportation needs:     Medical: Not on file     Non-medical: Not on file   Tobacco Use   ? Smoking status: Never Smoker   ? Smokeless tobacco: Never Used   Substance and Sexual Activity   ? Alcohol use: Yes     Alcohol/week: 4.2 oz     Types: 7 Glasses of wine per week     Comment: 1 glass of red wine per night   ? Drug use: Not on file   ? Sexual activity: Not on file   Lifestyle   ? Physical activity:     Days per week: Not on file     Minutes per session: Not on file   ? Stress: Not on file   Relationships   ? Social connections:     Talks on phone: Not on file     Gets together: Not on file     Attends Anglican service: Not on file     Active member of club or organization: Not on file     Attends meetings of clubs or organizations: Not on file     Relationship status: Not on file   ? Intimate partner violence:     Fear of current or ex partner: Not on file     Emotionally abused: Not on file     Physically abused: Not on file     Forced sexual activity: Not on file   Other Topics Concern   ? Not on file   Social History Narrative   ? Not on file          Review of Systems   He has minimal if any pain.  He was initially constipated for 3 days postoperatively, but that has straightened around he feels fine now.  He lives in a house alone, is looking forward to being active once he heals from the current surgery.  The remainder of the comprehensive review of systems is negative.    Vitals:    05/10/19 0830   BP: 149/90   Pulse: 70   Resp: 20   Temp: 98.1  F (36.7  C)   SpO2: 97%   Weight: (!) 256 lb 9.6 oz (116.4 kg)       Physical Exam   Constitutional: He is oriented to person, place, and time. He appears well-nourished. No  distress.   HENT:   Nose: Nose normal.   Mouth/Throat: Oropharynx is clear and moist.   Eyes: Conjunctivae and EOM are normal.   Cardiovascular: Regular rhythm and normal heart sounds.   No murmur heard.  Pulmonary/Chest: Breath sounds normal. He has no wheezes. He has no rales.   Abdominal: Soft. Bowel sounds are normal. There is no tenderness. There is no rebound.   Musculoskeletal: Normal range of motion. He exhibits tenderness. He exhibits no edema.   Minimal tenderness around the incision which is clean and dry, no erythema   Lymphadenopathy:     He has no cervical adenopathy.   Neurological: He is alert and oriented to person, place, and time.   Skin: Skin is warm and dry. No rash noted.   Psychiatric: He has a normal mood and affect. Thought content normal.       Medication List:  Current Outpatient Medications   Medication Sig   ? acetaminophen (TYLENOL) 325 MG tablet Take 650 mg by mouth every 4 (four) hours as needed for pain or fever.   ? [START ON 5/16/2019] amLODIPine (NORVASC) 2.5 MG tablet Take 5 mg by mouth daily. Hold for SBP <130         ? [START ON 5/16/2019] aspirin 325 MG EC tablet Take 325 mg by mouth daily. For 14 days         ? enoxaparin (LOVENOX) 40 mg/0.4 mL syringe Inject 40 mg under the skin daily. For 11 days             Labs:  Results for orders placed or performed in visit on 05/06/19   Basic Metabolic Panel   Result Value Ref Range     Sodium 140 136 - 145 mmol/L     Potassium 4.0 3.5 - 5.0 mmol/L     Chloride 106 98 - 107 mmol/L     CO2 25 22 - 31 mmol/L     Anion Gap, Calculation 9 5 - 18 mmol/L     Glucose 76 70 - 125 mg/dL     Calcium 9.8 8.5 - 10.5 mg/dL     BUN 19 8 - 28 mg/dL     Creatinine 0.97 0.70 - 1.30 mg/dL     GFR MDRD Af Amer >60 >60 mL/min/1.73m2     GFR MDRD Non Af Amer >60 >60 mL/min/1.73m2            Lab Results   Component Value Date     WBC 7.7 05/06/2019     HGB 13.4 (L) 05/06/2019     HCT 41.1 05/06/2019     MCV 94 05/06/2019      05/06/2019        Assessment  /  Plan:    ICD-10-CM    1. Status post total replacement of left hip Z96.642  doing very well in therapy, minimal pain   2. Essential hypertension I10  days blood pressure reading is high, reviewing all of the blood pressures taken at the facility, this is the only one with a slightly high systolic and diastolic.   3. Obstructive sleep apnea syndrome G47.33  Has his CPAP machine here at the TCU.       Electronically signed by: Eloisa Flannery MD

## 2021-06-19 NOTE — LETTER
"Letter by Carlo Manuel CNP at      Author: Carlo Manuel CNP Service: -- Author Type: --    Filed:  Encounter Date: 2019 Status: (Other)         Patient: Les Kraus   MR Number: [id number]   YOB: 1945   Date of Visit: 2019     [facility]    Name:   Les Kraus  : 1945  Facility:   [facility] [[id number]]   Room:   Code Status: FULL CODE -   Fac type:   SNF (Skilled Nursing Facility, TCU) -     CHIEF COMPLAINT / REASON FOR VISIT:  Chief Complaint   Patient presents with   ? Follow-up     TCU follow-up after hospitalization for left MARC.     [facility]) from 2019 until 2019 (left MARC)       HPI: Les is a 73 y.o. male with a history of hypertension, obstructive sleep apnea (CPAP), osteoarthritis (history of right MARC in 2016), and a history of anaphylaxis, who underwent a left total hip arthroplasty on 2019.  There were no perioperative complications, and he was treated prophylactically with Ancef.    Orders from Ortho called for 2 weeks of enoxaparin followed by 2 weeks of aspirin.  He appears to have some history of urinary retention (BPH); however, he is not yet on Flomax.    History of anaphylaxis: Occurred approximately 10 to 15 years ago after eating pizza and drinking red wine, been going on a long walk.  Evaluated by an allergist who could not determine cause.  He has an EpiPen which he has never used.  No further recurrences, though he typically exercises on an empty stomach.      CURRENT ISSUES    No real issues.  He has not yet been seen by physical therapy.  He does have orders for CPAP but no directions.    ROS:   Pain is minimal, except with movement.  He was given oxycodone in the hospital, but he \"gave that up.\"  He was also receiving acetaminophen, and that was stopped as well.  No headaches or chest pains, coughing or congestion, nausea or vomiting, dizziness or " dyspnea, dysuria, constipation or diarrhea, difficulty chewing or swallowing, integumentary issues, or problems with appetite or sleep.    Past Medical History:   Diagnosis Date   ? Acute blood loss anemia    ? BPH (benign prostatic hyperplasia)    ? Constipation    ? Depression    ? Exercise induced anaphylaxis     After eating certain foods. No issues for several years. Allergist could not determine cause.   ? HTN (hypertension)    ? Osteoarthritis of right hip    ? Sleep apnea               Family History   Problem Relation Age of Onset   ? Alzheimer's disease Mother    ? Coronary aneurysm Father    ? Coronary artery disease Father      Social History     Socioeconomic History   ? Marital status:      Spouse name: Not on file   ? Number of children: Not on file   ? Years of education: Not on file   ? Highest education level: Not on file   Occupational History   ? Not on file   Social Needs   ? Financial resource strain: Not on file   ? Food insecurity:     Worry: Not on file     Inability: Not on file   ? Transportation needs:     Medical: Not on file     Non-medical: Not on file   Tobacco Use   ? Smoking status: Never Smoker   ? Smokeless tobacco: Never Used   Substance and Sexual Activity   ? Alcohol use: Yes     Alcohol/week: 4.2 oz     Types: 7 Glasses of wine per week     Comment: 1 glass of red wine per night   ? Drug use: Not on file   ? Sexual activity: Not on file   Lifestyle   ? Physical activity:     Days per week: Not on file     Minutes per session: Not on file   ? Stress: Not on file   Relationships   ? Social connections:     Talks on phone: Not on file     Gets together: Not on file     Attends Protestant service: Not on file     Active member of club or organization: Not on file     Attends meetings of clubs or organizations: Not on file     Relationship status: Not on file   ? Intimate partner violence:     Fear of current or ex partner: Not on file     Emotionally abused: Not on file      "Physically abused: Not on file     Forced sexual activity: Not on file   Other Topics Concern   ? Not on file   Social History Narrative   ? Not on file       MEDICATIONS: Reviewed from the MAR, physician orders, and/or earlier progress notes.    ALLERGIES: No Known Allergies    DIET: Regular, regular texture, thin liquids.    Vitals:    05/06/19 1513   BP: 139/82   Pulse: 80   Resp: 18   Temp: 98.1  F (36.7  C)   SpO2: 97%   Weight: (!) 237 lb 3.2 oz (107.6 kg)   Height: 5' 9\" (1.753 m)     Body mass index is 35.03 kg/m .    EXAMINATION:   General: Pleasant, alert, conversant middle-aged male, sitting on his bed, in no apparent distress.  Head: Normocephalic and atraumatic.   Eyes: PERRLA, sclerae clear.   ENT: Moist oral mucosa.  He has his own teeth.  No rhinorrhea or nasal discharge.  Hearing is unimpaired.  Cardiovascular: Regular rate and rhythm.  No appreciable murmur.  Respiratory: Lungs clear to auscultation bilaterally.   Abdomen: Soft and nontender.   Musculoskeletal/Extremities: No peripheral edema.  Integument: No rashes, clinically significant lesions, or skin breakdown.   Cognitive/Psychiatric: Alert and oriented x3.  Affect is euthymic.    DIAGNOSTICS:   Results for orders placed or performed in visit on 05/06/19   Basic Metabolic Panel   Result Value Ref Range    Sodium 140 136 - 145 mmol/L    Potassium 4.0 3.5 - 5.0 mmol/L    Chloride 106 98 - 107 mmol/L    CO2 25 22 - 31 mmol/L    Anion Gap, Calculation 9 5 - 18 mmol/L    Glucose 76 70 - 125 mg/dL    Calcium 9.8 8.5 - 10.5 mg/dL    BUN 19 8 - 28 mg/dL    Creatinine 0.97 0.70 - 1.30 mg/dL    GFR MDRD Af Amer >60 >60 mL/min/1.73m2    GFR MDRD Non Af Amer >60 >60 mL/min/1.73m2     Lab Results   Component Value Date    WBC 7.7 05/06/2019    HGB 13.4 (L) 05/06/2019    HCT 41.1 05/06/2019    MCV 94 05/06/2019     05/06/2019     Estimated Creatinine Clearance: 80.8 mL/min (by C-G formula based on SCr of 0.97 mg/dL).      ASSESSMENT/Plan:      " ICD-10-CM    1. H/O total hip arthroplasty, left (2019) Z96.642    2. H/O total right hip replacement (2016) Z96.641    3. Essential hypertension I10    4. Obstructive sleep apnea syndrome G47.33      CHANGES:    None.    CARE PLAN:    The care plan has been reviewed and all orders signed. Changes to care plan, if any, as noted. Otherwise, continue current plan of care.    The above has been created using voice recognition software. Please be aware that this may unintentionally  produce inaccuracies and/or nonsensical sentences.      Electronically signed by: Carlo Manuel, CNP

## 2021-06-19 NOTE — LETTER
Letter by Carlo Manuel CNP at      Author: Carlo Manuel CNP Service: -- Author Type: --    Filed:  Encounter Date: 2019 Status: (Other)         Patient: Les Kraus   MR Number: [id number]   YOB: 1945   Date of Visit: 2019     [facility]    Name:   Les Kraus  : 1945  Facility:   [facility] [[id number]]   Room:   Code Status: FULL CODE -   Fac type:   SNF (Skilled Nursing Facility, TCU) -     CHIEF COMPLAINT / REASON FOR VISIT:  Chief Complaint   Patient presents with   ? Discharge Summary     TCU discharge after hospitalization for left MARC.     [facility]) from 2019 until 2019 (left MARC)   [facility] from 2019 until 05/15/2019      HPI: Les is a 73 y.o. male with a history of hypertension, obstructive sleep apnea (CPAP), osteoarthritis (history of right MARC in 2016), and a history of anaphylaxis, who underwent a left total hip arthroplasty on 2019.  There were no perioperative complications, and he was treated prophylactically with Ancef.    Orders from Ortho called for 2 weeks of enoxaparin followed by 2 weeks of aspirin.     History of anaphylaxis: Occurred approximately 10 to 15 years ago after eating pizza and drinking red wine, been going on a long walk.  Evaluated by an allergist who could not determine cause.  He has an EpiPen which he has never used.  No further recurrences, though he typically exercises on an empty stomach.      TCU ISSUES    Pain: Pain has been minimal.  He was given oxycodone in the hospital, but did not receive any here.  In fact, he did not even ask for acetaminophen.  We are discontinuing the oxycodone.    Urinary retention: He has been on tamsulosin but does not want to take this.  He denies having any issues with BPH.  He does say that he has always had problems coming off anesthesia, experiencing urinary retention as  result, but this soon resolved.  We are discontinuing his tamsulosin.    Constipation: This is not been an issue, and he has been refusing his senna S and has not needed either MiraLAX or bisacodyl suppositories.  We are discontinuing those as well.    Discharge planning: He will require home PT to continue to progress with a cane in the home setting.  Discharge date is expected to be 05/15/2019.    ROS:   No headaches or chest pains, coughing or congestion, nausea or vomiting, dizziness or dyspnea, dysuria, constipation or diarrhea, difficulty chewing or swallowing, integumentary issues, or problems with appetite or sleep.    Past Medical History:   Diagnosis Date   ? Acute blood loss anemia    ? BPH (benign prostatic hyperplasia)    ? Constipation    ? Depression    ? Exercise induced anaphylaxis     After eating certain foods. No issues for several years. Allergist could not determine cause.   ? HTN (hypertension)    ? Osteoarthritis of right hip    ? Sleep apnea               Family History   Problem Relation Age of Onset   ? Alzheimer's disease Mother    ? Coronary aneurysm Father    ? Coronary artery disease Father      Social History     Socioeconomic History   ? Marital status:      Spouse name: Not on file   ? Number of children: Not on file   ? Years of education: Not on file   ? Highest education level: Not on file   Occupational History   ? Not on file   Social Needs   ? Financial resource strain: Not on file   ? Food insecurity:     Worry: Not on file     Inability: Not on file   ? Transportation needs:     Medical: Not on file     Non-medical: Not on file   Tobacco Use   ? Smoking status: Never Smoker   ? Smokeless tobacco: Never Used   Substance and Sexual Activity   ? Alcohol use: Yes     Alcohol/week: 4.2 oz     Types: 7 Glasses of wine per week     Comment: 1 glass of red wine per night   ? Drug use: Not on file   ? Sexual activity: Not on file   Lifestyle   ? Physical activity:     Days per  "week: Not on file     Minutes per session: Not on file   ? Stress: Not on file   Relationships   ? Social connections:     Talks on phone: Not on file     Gets together: Not on file     Attends Latter day service: Not on file     Active member of club or organization: Not on file     Attends meetings of clubs or organizations: Not on file     Relationship status: Not on file   ? Intimate partner violence:     Fear of current or ex partner: Not on file     Emotionally abused: Not on file     Physically abused: Not on file     Forced sexual activity: Not on file   Other Topics Concern   ? Not on file   Social History Narrative   ? Not on file       MEDICATIONS: Reviewed from the MAR, physician orders, and/or earlier progress notes.  Updated by me today (05/13/2019) with discontinue of several medications as noted above reflected below.  Current Outpatient Medications   Medication Sig   ? acetaminophen (TYLENOL) 325 MG tablet Take 650 mg by mouth every 4 (four) hours as needed for pain or fever.   ? [START ON 5/16/2019] amLODIPine (NORVASC) 2.5 MG tablet Take 5 mg by mouth daily. Hold for SBP <130         ? [START ON 5/16/2019] aspirin 325 MG EC tablet Take 325 mg by mouth daily. For 14 days         ? enoxaparin (LOVENOX) 40 mg/0.4 mL syringe Inject 40 mg under the skin daily. For 11 days           ALLERGIES: No Known Allergies    DIET: Regular, regular texture, thin liquids.    Vitals:    05/13/19 1441   BP: 112/76   Pulse: 90   Resp: 16   Temp: 98.8  F (37.1  C)   SpO2: 98%   Weight: (!) 256 lb 9.6 oz (116.4 kg)   Height: 5' 9\" (1.753 m)     Body mass index is 37.89 kg/m .    EXAMINATION:   General: Pleasant, alert, conversant middle-aged male, sitting in a recliner, in no apparent distress.  Head: Normocephalic and atraumatic.   Eyes: PERRLA, sclerae clear.   ENT: Moist oral mucosa.  He has his own teeth.  No rhinorrhea or nasal discharge.  Hearing is unimpaired.  Cardiovascular: Regular rate and rhythm with possibly " a 1/6 [facility] [place].  Respiratory: Lungs clear to auscultation bilaterally.   Abdomen: Soft and nontender.   Musculoskeletal/Extremities: No peripheral edema.  Integument: No rashes, clinically significant lesions, or skin breakdown.   Cognitive/Psychiatric: Alert and oriented x3.  Affect is euthymic.    DIAGNOSTICS:   Results for orders placed or performed in visit on 05/06/19   Basic Metabolic Panel   Result Value Ref Range    Sodium 140 136 - 145 mmol/L    Potassium 4.0 3.5 - 5.0 mmol/L    Chloride 106 98 - 107 mmol/L    CO2 25 22 - 31 mmol/L    Anion Gap, Calculation 9 5 - 18 mmol/L    Glucose 76 70 - 125 mg/dL    Calcium 9.8 8.5 - 10.5 mg/dL    BUN 19 8 - 28 mg/dL    Creatinine 0.97 0.70 - 1.30 mg/dL    GFR MDRD Af Amer >60 >60 mL/min/1.73m2    GFR MDRD Non Af Amer >60 >60 mL/min/1.73m2     Lab Results   Component Value Date    WBC 7.7 05/06/2019    HGB 13.4 (L) 05/06/2019    HCT 41.1 05/06/2019    MCV 94 05/06/2019     05/06/2019     CrCl cannot be calculated (Patient's most recent lab result is older than the maximum 5 days allowed.).      ASSESSMENT/Plan:      ICD-10-CM    1. H/O total hip arthroplasty, left Z96.642    2. History of total right hip replacement Z96.641    3. Essential hypertension I10    4. Obstructive sleep apnea syndrome G47.33      DISCHARGE PLAN/FACE TO FACE:  I certify that this patient is under my care and that I had a face-to-face encounter that meets the physician face-to-face encounter requirements with this patient.     Date of Face-to-Face Encounter: 05/13/2019     I certify that, based on my findings, the following services are medically necessary home health services: Home PT    My clinical findings support the need for the above skilled services because: (Please write a brief narrative summary that describes what the RN, PT, SLP, or other services will be doing in the home. A list of diagnoses in this section does not meet the CMS requirements): Home PT to continue  therapy/progress with a cane in the home setting.    This patient is homebound because: (Please write a brief narrative summmary describing the functional limitations as to why this patient is homebound and specifically what makes this patient homebound.):  Above services require being performed in the home to be of benefit.    The patient is, or has been, under my care and I have initiated the establishment of the plan of care. This patient will be followed by a physician who will periodically review the plan of care.  Initial follow-up should be within 7-10 days.    Approximate time spent with this patient was greater than 30 minutes with greater than 50% spent in discussions regarding services required upon discharge.    The above has been created using voice recognition software. Please be aware that this may unintentionally  produce inaccuracies and/or nonsensical sentences.      Electronically signed by: Carlo Manuel CNP

## 2021-06-19 NOTE — LETTER
"Letter by Carlo Manuel CNP at      Author: Carlo Manuel CNP Service: -- Author Type: --    Filed:  Encounter Date: 2019 Status: (Other)         Patient: Les Kraus   MR Number: [id number]   YOB: 1945   Date of Visit: 2019     [facility]    Name:   Les Kraus  : 1945  Facility:   [facility] [[id number]]   Room:   Code Status: FULL CODE -   Fac type:   SNF (Skilled Nursing Facility, TCU) -     CHIEF COMPLAINT / REASON FOR VISIT:  Chief Complaint   Patient presents with   ? Follow-up     TCU follow-up after hospitalization for left MARC.     [facility]) from 2019 until 2019 (left MARC)     Patient was last seen by me on 2019.      HPI: Les is a 73 y.o. male with a history of hypertension, obstructive sleep apnea (CPAP), osteoarthritis (history of right MARC in 2016), and a history of anaphylaxis, who underwent a left total hip arthroplasty on 2019.  There were no perioperative complications, and he was treated prophylactically with Ancef.    Orders from Ortho called for 2 weeks of enoxaparin followed by 2 weeks of aspirin.  He appears to have some history of urinary retention (BPH); however, he is not yet on Flomax.    History of anaphylaxis: Occurred approximately 10 to 15 years ago after eating pizza and drinking red wine, been going on a long walk.  Evaluated by an allergist who could not determine cause.  He has an EpiPen which he has never used.  No further recurrences, though he typically exercises on an empty stomach.      CURRENT ISSUES    No real issues.  He has not yet been seen by physical therapy.  He does have orders for CPAP but no directions.  He is up, walking, and telling me he feels \"pretty good.\"    ROS:   Pain is minimal, except with movement.  He was given oxycodone in the hospital, but has not had any here.  In fact, he does not even ask for " acetaminophen. No headaches or chest pains, coughing or congestion, nausea or vomiting, dizziness or dyspnea, dysuria, constipation or diarrhea, difficulty chewing or swallowing, integumentary issues, or problems with appetite or sleep.    Past Medical History:   Diagnosis Date   ? Acute blood loss anemia    ? BPH (benign prostatic hyperplasia)    ? Constipation    ? Depression    ? Exercise induced anaphylaxis     After eating certain foods. No issues for several years. Allergist could not determine cause.   ? HTN (hypertension)    ? Osteoarthritis of right hip    ? Sleep apnea               Family History   Problem Relation Age of Onset   ? Alzheimer's disease Mother    ? Coronary aneurysm Father    ? Coronary artery disease Father      Social History     Socioeconomic History   ? Marital status:      Spouse name: Not on file   ? Number of children: Not on file   ? Years of education: Not on file   ? Highest education level: Not on file   Occupational History   ? Not on file   Social Needs   ? Financial resource strain: Not on file   ? Food insecurity:     Worry: Not on file     Inability: Not on file   ? Transportation needs:     Medical: Not on file     Non-medical: Not on file   Tobacco Use   ? Smoking status: Never Smoker   ? Smokeless tobacco: Never Used   Substance and Sexual Activity   ? Alcohol use: Yes     Alcohol/week: 4.2 oz     Types: 7 Glasses of wine per week     Comment: 1 glass of red wine per night   ? Drug use: Not on file   ? Sexual activity: Not on file   Lifestyle   ? Physical activity:     Days per week: Not on file     Minutes per session: Not on file   ? Stress: Not on file   Relationships   ? Social connections:     Talks on phone: Not on file     Gets together: Not on file     Attends [employer]ism service: Not on file     Active member of club or organization: Not on file     Attends meetings of clubs or organizations: Not on file     Relationship status: Not on file   ? Intimate  "partner violence:     Fear of current or ex partner: Not on file     Emotionally abused: Not on file     Physically abused: Not on file     Forced sexual activity: Not on file   Other Topics Concern   ? Not on file   Social History Narrative   ? Not on file       MEDICATIONS: Reviewed from the MAR, physician orders, and/or earlier progress notes.    ALLERGIES: No Known Allergies    DIET: Regular, regular texture, thin liquids.    Vitals:    05/08/19 1544   BP: 118/70   Pulse: 82   Resp: 18   Temp: 97.7  F (36.5  C)   SpO2: 97%   Weight: (!) 237 lb 3.2 oz (107.6 kg)   Height: 5' 9\" (1.753 m)     Body mass index is 35.03 kg/m .    EXAMINATION:   General: Pleasant, alert, conversant middle-aged male, sitting in a recliner, in no apparent distress.  Head: Normocephalic and atraumatic.   Eyes: PERRLA, sclerae clear.   ENT: Moist oral mucosa.  He has his own teeth.  No rhinorrhea or nasal discharge.  Hearing is unimpaired.  Cardiovascular: Regular rate and rhythm with possibly a 1/6 BRENDAN at LSB.  Respiratory: Lungs clear to auscultation bilaterally.   Abdomen: Soft and nontender.   Musculoskeletal/Extremities: No peripheral edema.  Integument: No rashes, clinically significant lesions, or skin breakdown.   Cognitive/Psychiatric: Alert and oriented x3.  Affect is euthymic.    DIAGNOSTICS:   Results for orders placed or performed in visit on 05/06/19   Basic Metabolic Panel   Result Value Ref Range    Sodium 140 136 - 145 mmol/L    Potassium 4.0 3.5 - 5.0 mmol/L    Chloride 106 98 - 107 mmol/L    CO2 25 22 - 31 mmol/L    Anion Gap, Calculation 9 5 - 18 mmol/L    Glucose 76 70 - 125 mg/dL    Calcium 9.8 8.5 - 10.5 mg/dL    BUN 19 8 - 28 mg/dL    Creatinine 0.97 0.70 - 1.30 mg/dL    GFR MDRD Af Amer >60 >60 mL/min/1.73m2    GFR MDRD Non Af Amer >60 >60 mL/min/1.73m2     Lab Results   Component Value Date    WBC 7.7 05/06/2019    HGB 13.4 (L) 05/06/2019    HCT 41.1 05/06/2019    MCV 94 05/06/2019     05/06/2019 "     Estimated Creatinine Clearance: 80.8 mL/min (by C-G formula based on SCr of 0.97 mg/dL).      ASSESSMENT/Plan:      ICD-10-CM    1. H/O total hip arthroplasty, left Z96.642    2. History of total right hip replacement Z96.641    3. Essential hypertension I10    4. Obstructive sleep apnea syndrome G47.33      CHANGES:    None.    CARE PLAN:    The care plan has been reviewed and all orders signed. Changes to care plan, if any, as noted. Otherwise, continue current plan of care.    The above has been created using voice recognition software. Please be aware that this may unintentionally  produce inaccuracies and/or nonsensical sentences.      Electronically signed by: Carlo Manuel CNP

## 2021-06-22 NOTE — PLAN OF CARE
VS: /76 (BP Location: Left arm)   Pulse 69   Temp 100.4  F (38  C) (Oral)   Resp 20   SpO2 94%      O2: Room air   Output: Voiding in urinal, PVRs less than 300   Last BM: 4/30/2019   Activity: 1 assist   Up for meals? Yes   Skin: Warm, dry   Pain: Tolerable with PRN oxy   CMS: Denies numbness and tingling   Dressing: CDI   Diet: Regular   LDA: No PIV   Equipment: PCDs   Plan: To TCU on Saturday   Additional Info: Pt running a slight fever at 1600 (100.4); doctor notified and ordered urine analysis. Pt refused PRN tylenol. Baseline urine retention. PVRs under 300. Pt had a quiet, restful day. To bed by 8:30 with CPAP. Refused evening meds (Tylenol and senna).        unknown

## 2021-09-18 ENCOUNTER — HEALTH MAINTENANCE LETTER (OUTPATIENT)
Age: 76
End: 2021-09-18

## 2021-11-18 ENCOUNTER — LAB REQUISITION (OUTPATIENT)
Dept: LAB | Facility: CLINIC | Age: 76
End: 2021-11-18
Payer: MEDICARE

## 2021-11-18 DIAGNOSIS — Z12.5 ENCOUNTER FOR SCREENING FOR MALIGNANT NEOPLASM OF PROSTATE: ICD-10-CM

## 2021-11-18 DIAGNOSIS — R82.998 OTHER ABNORMAL FINDINGS IN URINE: ICD-10-CM

## 2021-11-18 DIAGNOSIS — I10 ESSENTIAL (PRIMARY) HYPERTENSION: ICD-10-CM

## 2021-11-18 LAB
ALBUMIN SERPL-MCNC: 3.9 G/DL (ref 3.5–5)
ALBUMIN UR-MCNC: NEGATIVE MG/DL
ALP SERPL-CCNC: 55 U/L (ref 45–120)
ALT SERPL W P-5'-P-CCNC: 19 U/L (ref 0–45)
ANION GAP SERPL CALCULATED.3IONS-SCNC: 10 MMOL/L (ref 5–18)
APPEARANCE UR: CLEAR
AST SERPL W P-5'-P-CCNC: 8 U/L (ref 0–40)
BASOPHILS # BLD AUTO: 0.1 10E3/UL (ref 0–0.2)
BASOPHILS NFR BLD AUTO: 1 %
BILIRUB SERPL-MCNC: 1.7 MG/DL (ref 0–1)
BILIRUB UR QL STRIP: NEGATIVE
BUN SERPL-MCNC: 17 MG/DL (ref 8–28)
CALCIUM SERPL-MCNC: 9.8 MG/DL (ref 8.5–10.5)
CHLORIDE BLD-SCNC: 106 MMOL/L (ref 98–107)
CHOLEST SERPL-MCNC: 198 MG/DL
CO2 SERPL-SCNC: 25 MMOL/L (ref 22–31)
COLOR UR AUTO: ABNORMAL
CREAT SERPL-MCNC: 1.15 MG/DL (ref 0.7–1.3)
EOSINOPHIL # BLD AUTO: 0.2 10E3/UL (ref 0–0.7)
EOSINOPHIL NFR BLD AUTO: 3 %
ERYTHROCYTE [DISTWIDTH] IN BLOOD BY AUTOMATED COUNT: 12.4 % (ref 10–15)
FASTING STATUS PATIENT QL REPORTED: NO
GFR SERPL CREATININE-BSD FRML MDRD: 61 ML/MIN/1.73M2
GLUCOSE BLD-MCNC: 83 MG/DL (ref 70–125)
GLUCOSE UR STRIP-MCNC: NEGATIVE MG/DL
HCT VFR BLD AUTO: 47.5 % (ref 40–53)
HDLC SERPL-MCNC: 30 MG/DL
HGB BLD-MCNC: 15.9 G/DL (ref 13.3–17.7)
HGB UR QL STRIP: NEGATIVE
IMM GRANULOCYTES # BLD: 0 10E3/UL
IMM GRANULOCYTES NFR BLD: 1 %
KETONES UR STRIP-MCNC: NEGATIVE MG/DL
LDLC SERPL CALC-MCNC: 138 MG/DL
LEUKOCYTE ESTERASE UR QL STRIP: NEGATIVE
LYMPHOCYTES # BLD AUTO: 0.9 10E3/UL (ref 0.8–5.3)
LYMPHOCYTES NFR BLD AUTO: 15 %
MCH RBC QN AUTO: 31.3 PG (ref 26.5–33)
MCHC RBC AUTO-ENTMCNC: 33.5 G/DL (ref 31.5–36.5)
MCV RBC AUTO: 94 FL (ref 78–100)
MONOCYTES # BLD AUTO: 0.7 10E3/UL (ref 0–1.3)
MONOCYTES NFR BLD AUTO: 11 %
MUCOUS THREADS #/AREA URNS LPF: PRESENT /LPF
NEUTROPHILS # BLD AUTO: 4.3 10E3/UL (ref 1.6–8.3)
NEUTROPHILS NFR BLD AUTO: 69 %
NITRATE UR QL: NEGATIVE
NRBC # BLD AUTO: 0 10E3/UL
NRBC BLD AUTO-RTO: 0 /100
PH UR STRIP: 7 [PH] (ref 5–7)
PLATELET # BLD AUTO: 262 10E3/UL (ref 150–450)
POTASSIUM BLD-SCNC: 4.2 MMOL/L (ref 3.5–5)
PROT SERPL-MCNC: 7 G/DL (ref 6–8)
PSA SERPL-MCNC: 0.66 UG/L (ref 0–6.5)
RBC # BLD AUTO: 5.08 10E6/UL (ref 4.4–5.9)
RBC URINE: 1 /HPF
SODIUM SERPL-SCNC: 141 MMOL/L (ref 136–145)
SP GR UR STRIP: 1.02 (ref 1–1.03)
TRIGL SERPL-MCNC: 151 MG/DL
UROBILINOGEN UR STRIP-MCNC: <2 MG/DL
WBC # BLD AUTO: 6.2 10E3/UL (ref 4–11)
WBC URINE: <1 /HPF

## 2021-11-18 PROCEDURE — G0103 PSA SCREENING: HCPCS | Mod: ORL | Performed by: FAMILY MEDICINE

## 2021-11-18 PROCEDURE — 85025 COMPLETE CBC W/AUTO DIFF WBC: CPT | Mod: ORL | Performed by: FAMILY MEDICINE

## 2021-11-18 PROCEDURE — 80061 LIPID PANEL: CPT | Mod: ORL | Performed by: FAMILY MEDICINE

## 2021-11-18 PROCEDURE — 81001 URINALYSIS AUTO W/SCOPE: CPT | Mod: ORL | Performed by: FAMILY MEDICINE

## 2021-11-18 PROCEDURE — 80053 COMPREHEN METABOLIC PANEL: CPT | Mod: ORL | Performed by: FAMILY MEDICINE

## 2022-03-05 ENCOUNTER — HEALTH MAINTENANCE LETTER (OUTPATIENT)
Age: 77
End: 2022-03-05

## 2022-03-22 ENCOUNTER — LAB REQUISITION (OUTPATIENT)
Dept: LAB | Facility: CLINIC | Age: 77
End: 2022-03-22
Payer: MEDICARE

## 2022-03-22 DIAGNOSIS — R42 DIZZINESS AND GIDDINESS: ICD-10-CM

## 2022-03-22 LAB
ALBUMIN SERPL-MCNC: 3.9 G/DL (ref 3.5–5)
ALP SERPL-CCNC: 53 U/L (ref 45–120)
ALT SERPL W P-5'-P-CCNC: 21 U/L (ref 0–45)
ANION GAP SERPL CALCULATED.3IONS-SCNC: 10 MMOL/L (ref 5–18)
AST SERPL W P-5'-P-CCNC: 9 U/L (ref 0–40)
BASOPHILS # BLD AUTO: 0.1 10E3/UL (ref 0–0.2)
BASOPHILS NFR BLD AUTO: 1 %
BILIRUB SERPL-MCNC: 1.2 MG/DL (ref 0–1)
BUN SERPL-MCNC: 14 MG/DL (ref 8–28)
CALCIUM SERPL-MCNC: 9.8 MG/DL (ref 8.5–10.5)
CHLORIDE BLD-SCNC: 106 MMOL/L (ref 98–107)
CO2 SERPL-SCNC: 25 MMOL/L (ref 22–31)
CREAT SERPL-MCNC: 1.06 MG/DL (ref 0.7–1.3)
EOSINOPHIL # BLD AUTO: 0.2 10E3/UL (ref 0–0.7)
EOSINOPHIL NFR BLD AUTO: 4 %
ERYTHROCYTE [DISTWIDTH] IN BLOOD BY AUTOMATED COUNT: 12.6 % (ref 10–15)
GFR SERPL CREATININE-BSD FRML MDRD: 72 ML/MIN/1.73M2
GLUCOSE BLD-MCNC: 97 MG/DL (ref 70–125)
HCT VFR BLD AUTO: 47.3 % (ref 40–53)
HGB BLD-MCNC: 15.7 G/DL (ref 13.3–17.7)
IMM GRANULOCYTES # BLD: 0 10E3/UL
IMM GRANULOCYTES NFR BLD: 1 %
LYMPHOCYTES # BLD AUTO: 0.8 10E3/UL (ref 0.8–5.3)
LYMPHOCYTES NFR BLD AUTO: 15 %
MCH RBC QN AUTO: 30.9 PG (ref 26.5–33)
MCHC RBC AUTO-ENTMCNC: 33.2 G/DL (ref 31.5–36.5)
MCV RBC AUTO: 93 FL (ref 78–100)
MONOCYTES # BLD AUTO: 0.6 10E3/UL (ref 0–1.3)
MONOCYTES NFR BLD AUTO: 11 %
NEUTROPHILS # BLD AUTO: 4 10E3/UL (ref 1.6–8.3)
NEUTROPHILS NFR BLD AUTO: 68 %
NRBC # BLD AUTO: 0 10E3/UL
NRBC BLD AUTO-RTO: 0 /100
PLATELET # BLD AUTO: 248 10E3/UL (ref 150–450)
POTASSIUM BLD-SCNC: 3.8 MMOL/L (ref 3.5–5)
PROT SERPL-MCNC: 7.2 G/DL (ref 6–8)
RBC # BLD AUTO: 5.08 10E6/UL (ref 4.4–5.9)
SODIUM SERPL-SCNC: 141 MMOL/L (ref 136–145)
WBC # BLD AUTO: 5.7 10E3/UL (ref 4–11)

## 2022-03-22 PROCEDURE — 80053 COMPREHEN METABOLIC PANEL: CPT | Mod: ORL | Performed by: NURSE PRACTITIONER

## 2022-03-22 PROCEDURE — 85025 COMPLETE CBC W/AUTO DIFF WBC: CPT | Mod: ORL | Performed by: NURSE PRACTITIONER

## 2022-03-23 ENCOUNTER — TELEPHONE (OUTPATIENT)
Dept: ORTHOPEDICS | Facility: CLINIC | Age: 77
End: 2022-03-23
Payer: MEDICARE

## 2022-03-23 NOTE — TELEPHONE ENCOUNTER
[facility] Call Center    Phone Message    May a detailed message be left on voicemail: yes     Reason for Call: Other: pt is asking if he needs antibiotics for dental procedure would like to get dental appt set up please call he does not use mychart     Action Taken: Other: ortho    Travel Screening: Not Applicable

## 2022-03-24 ENCOUNTER — MYC REFILL (OUTPATIENT)
Dept: ORTHOPEDICS | Facility: CLINIC | Age: 77
End: 2022-03-24
Payer: MEDICARE

## 2022-03-24 DIAGNOSIS — Z98.890 STATUS POST HIP SURGERY: Primary | ICD-10-CM

## 2022-03-24 RX ORDER — AMOXICILLIN 500 MG/1
2000 CAPSULE ORAL ONCE
Qty: 4 CAPSULE | Refills: 0 | Status: SHIPPED | OUTPATIENT
Start: 2022-03-24 | End: 2022-03-24

## 2022-03-24 NOTE — TELEPHONE ENCOUNTER
RN called pt to explain antibiotic has been sent to pharmacy. Pt sent message stating he would like antibiotics for upcoming dental procedure. RN filled Rx and sent to [facility].     [facility] [place]

## 2022-11-08 ENCOUNTER — LAB REQUISITION (OUTPATIENT)
Dept: LAB | Facility: CLINIC | Age: 77
End: 2022-11-08
Payer: MEDICARE

## 2022-11-08 DIAGNOSIS — I10 ESSENTIAL (PRIMARY) HYPERTENSION: ICD-10-CM

## 2022-11-08 DIAGNOSIS — R82.998 OTHER ABNORMAL FINDINGS IN URINE: ICD-10-CM

## 2022-11-08 DIAGNOSIS — Z12.5 ENCOUNTER FOR SCREENING FOR MALIGNANT NEOPLASM OF PROSTATE: ICD-10-CM

## 2022-11-08 DIAGNOSIS — R53.83 OTHER FATIGUE: ICD-10-CM

## 2022-11-08 LAB
ALBUMIN SERPL BCG-MCNC: 4.3 G/DL (ref 3.5–5.2)
ALBUMIN UR-MCNC: 10 MG/DL
ALP SERPL-CCNC: 56 U/L (ref 40–129)
ALT SERPL W P-5'-P-CCNC: 20 U/L (ref 10–50)
ANION GAP SERPL CALCULATED.3IONS-SCNC: 12 MMOL/L (ref 7–15)
APPEARANCE UR: CLEAR
AST SERPL W P-5'-P-CCNC: 10 U/L (ref 10–50)
BASOPHILS # BLD AUTO: 0.1 10E3/UL (ref 0–0.2)
BASOPHILS NFR BLD AUTO: 1 %
BILIRUB SERPL-MCNC: 1.3 MG/DL
BILIRUB UR QL STRIP: NEGATIVE
BUN SERPL-MCNC: 19.7 MG/DL (ref 8–23)
CALCIUM SERPL-MCNC: 9.2 MG/DL (ref 8.8–10.2)
CHLORIDE SERPL-SCNC: 104 MMOL/L (ref 98–107)
CHOLEST SERPL-MCNC: 185 MG/DL
COLOR UR AUTO: YELLOW
CREAT SERPL-MCNC: 1.16 MG/DL (ref 0.67–1.17)
DEPRECATED HCO3 PLAS-SCNC: 25 MMOL/L (ref 22–29)
EOSINOPHIL # BLD AUTO: 0.2 10E3/UL (ref 0–0.7)
EOSINOPHIL NFR BLD AUTO: 2 %
ERYTHROCYTE [DISTWIDTH] IN BLOOD BY AUTOMATED COUNT: 13 % (ref 10–15)
GFR SERPL CREATININE-BSD FRML MDRD: 64 ML/MIN/1.73M2
GLUCOSE SERPL-MCNC: 92 MG/DL (ref 70–99)
GLUCOSE UR STRIP-MCNC: NEGATIVE MG/DL
HCT VFR BLD AUTO: 45.7 % (ref 40–53)
HDLC SERPL-MCNC: 33 MG/DL
HGB BLD-MCNC: 14.8 G/DL (ref 13.3–17.7)
HGB UR QL STRIP: NEGATIVE
IMM GRANULOCYTES # BLD: 0 10E3/UL
IMM GRANULOCYTES NFR BLD: 0 %
KETONES UR STRIP-MCNC: NEGATIVE MG/DL
LDLC SERPL CALC-MCNC: 120 MG/DL
LEUKOCYTE ESTERASE UR QL STRIP: NEGATIVE
LYMPHOCYTES # BLD AUTO: 1 10E3/UL (ref 0.8–5.3)
LYMPHOCYTES NFR BLD AUTO: 15 %
MCH RBC QN AUTO: 30.6 PG (ref 26.5–33)
MCHC RBC AUTO-ENTMCNC: 32.4 G/DL (ref 31.5–36.5)
MCV RBC AUTO: 94 FL (ref 78–100)
MONOCYTES # BLD AUTO: 0.7 10E3/UL (ref 0–1.3)
MONOCYTES NFR BLD AUTO: 11 %
MUCOUS THREADS #/AREA URNS LPF: PRESENT /LPF
NEUTROPHILS # BLD AUTO: 4.8 10E3/UL (ref 1.6–8.3)
NEUTROPHILS NFR BLD AUTO: 71 %
NITRATE UR QL: NEGATIVE
NONHDLC SERPL-MCNC: 152 MG/DL
NRBC # BLD AUTO: 0 10E3/UL
NRBC BLD AUTO-RTO: 0 /100
PH UR STRIP: 6.5 [PH] (ref 5–7)
PLATELET # BLD AUTO: 255 10E3/UL (ref 150–450)
POTASSIUM SERPL-SCNC: 4.3 MMOL/L (ref 3.4–5.3)
PROT SERPL-MCNC: 7 G/DL (ref 6.4–8.3)
PSA SERPL-MCNC: 0.78 NG/ML (ref 0–6.5)
RBC # BLD AUTO: 4.84 10E6/UL (ref 4.4–5.9)
RBC URINE: 1 /HPF
SODIUM SERPL-SCNC: 141 MMOL/L (ref 136–145)
SP GR UR STRIP: 1.02 (ref 1–1.03)
SPERM #/AREA URNS HPF: PRESENT /HPF
TRIGL SERPL-MCNC: 162 MG/DL
UROBILINOGEN UR STRIP-MCNC: NORMAL MG/DL
WBC # BLD AUTO: 6.8 10E3/UL (ref 4–11)
WBC URINE: 1 /HPF

## 2022-11-08 PROCEDURE — G0103 PSA SCREENING: HCPCS | Mod: ORL | Performed by: FAMILY MEDICINE

## 2022-11-08 PROCEDURE — 81001 URINALYSIS AUTO W/SCOPE: CPT | Mod: ORL | Performed by: FAMILY MEDICINE

## 2022-11-08 PROCEDURE — 85025 COMPLETE CBC W/AUTO DIFF WBC: CPT | Mod: ORL | Performed by: FAMILY MEDICINE

## 2022-11-08 PROCEDURE — 80061 LIPID PANEL: CPT | Mod: ORL | Performed by: FAMILY MEDICINE

## 2022-11-08 PROCEDURE — 80053 COMPREHEN METABOLIC PANEL: CPT | Mod: ORL | Performed by: FAMILY MEDICINE

## 2022-11-19 ENCOUNTER — HEALTH MAINTENANCE LETTER (OUTPATIENT)
Age: 77
End: 2022-11-19

## 2023-04-15 ENCOUNTER — HEALTH MAINTENANCE LETTER (OUTPATIENT)
Age: 78
End: 2023-04-15

## 2023-05-11 ENCOUNTER — LAB REQUISITION (OUTPATIENT)
Dept: LAB | Facility: CLINIC | Age: 78
End: 2023-05-11
Payer: MEDICARE

## 2023-05-11 DIAGNOSIS — Z12.5 ENCOUNTER FOR SCREENING FOR MALIGNANT NEOPLASM OF PROSTATE: ICD-10-CM

## 2023-05-11 DIAGNOSIS — R82.998 OTHER ABNORMAL FINDINGS IN URINE: ICD-10-CM

## 2023-05-11 DIAGNOSIS — I10 ESSENTIAL (PRIMARY) HYPERTENSION: ICD-10-CM

## 2023-05-11 LAB
ALBUMIN SERPL BCG-MCNC: 4.1 G/DL (ref 3.5–5.2)
ALBUMIN UR-MCNC: 20 MG/DL
ALP SERPL-CCNC: 53 U/L (ref 40–129)
ALT SERPL W P-5'-P-CCNC: 19 U/L (ref 10–50)
ANION GAP SERPL CALCULATED.3IONS-SCNC: 11 MMOL/L (ref 7–15)
APPEARANCE UR: ABNORMAL
AST SERPL W P-5'-P-CCNC: 10 U/L (ref 10–50)
BASOPHILS # BLD AUTO: 0 10E3/UL (ref 0–0.2)
BASOPHILS NFR BLD AUTO: 1 %
BILIRUB SERPL-MCNC: 0.8 MG/DL
BILIRUB UR QL STRIP: NEGATIVE
BUN SERPL-MCNC: 20.4 MG/DL (ref 8–23)
CALCIUM SERPL-MCNC: 9.5 MG/DL (ref 8.8–10.2)
CHLORIDE SERPL-SCNC: 105 MMOL/L (ref 98–107)
CHOLEST SERPL-MCNC: 183 MG/DL
COLOR UR AUTO: ABNORMAL
CREAT SERPL-MCNC: 1.17 MG/DL (ref 0.67–1.17)
DEPRECATED HCO3 PLAS-SCNC: 24 MMOL/L (ref 22–29)
EOSINOPHIL # BLD AUTO: 0.4 10E3/UL (ref 0–0.7)
EOSINOPHIL NFR BLD AUTO: 6 %
ERYTHROCYTE [DISTWIDTH] IN BLOOD BY AUTOMATED COUNT: 13.4 % (ref 10–15)
GFR SERPL CREATININE-BSD FRML MDRD: 64 ML/MIN/1.73M2
GLUCOSE SERPL-MCNC: 111 MG/DL (ref 70–99)
GLUCOSE UR STRIP-MCNC: NEGATIVE MG/DL
HCT VFR BLD AUTO: 46.3 % (ref 40–53)
HDLC SERPL-MCNC: 30 MG/DL
HGB BLD-MCNC: 14.7 G/DL (ref 13.3–17.7)
HGB UR QL STRIP: NEGATIVE
IMM GRANULOCYTES # BLD: 0 10E3/UL
IMM GRANULOCYTES NFR BLD: 1 %
KETONES UR STRIP-MCNC: NEGATIVE MG/DL
LDLC SERPL CALC-MCNC: 111 MG/DL
LEUKOCYTE ESTERASE UR QL STRIP: NEGATIVE
LYMPHOCYTES # BLD AUTO: 1 10E3/UL (ref 0.8–5.3)
LYMPHOCYTES NFR BLD AUTO: 15 %
MCH RBC QN AUTO: 30.2 PG (ref 26.5–33)
MCHC RBC AUTO-ENTMCNC: 31.7 G/DL (ref 31.5–36.5)
MCV RBC AUTO: 95 FL (ref 78–100)
MONOCYTES # BLD AUTO: 0.9 10E3/UL (ref 0–1.3)
MONOCYTES NFR BLD AUTO: 15 %
MUCOUS THREADS #/AREA URNS LPF: PRESENT /LPF
NEUTROPHILS # BLD AUTO: 4 10E3/UL (ref 1.6–8.3)
NEUTROPHILS NFR BLD AUTO: 62 %
NITRATE UR QL: NEGATIVE
NONHDLC SERPL-MCNC: 153 MG/DL
NRBC # BLD AUTO: 0 10E3/UL
NRBC BLD AUTO-RTO: 0 /100
PH UR STRIP: 5.5 [PH] (ref 5–7)
PLATELET # BLD AUTO: 279 10E3/UL (ref 150–450)
POTASSIUM SERPL-SCNC: 4 MMOL/L (ref 3.4–5.3)
PROT SERPL-MCNC: 6.8 G/DL (ref 6.4–8.3)
PSA SERPL DL<=0.01 NG/ML-MCNC: 0.84 NG/ML (ref 0–6.5)
RBC # BLD AUTO: 4.87 10E6/UL (ref 4.4–5.9)
RBC URINE: 0 /HPF
SODIUM SERPL-SCNC: 140 MMOL/L (ref 136–145)
SP GR UR STRIP: 1.03 (ref 1–1.03)
TRIGL SERPL-MCNC: 210 MG/DL
UROBILINOGEN UR STRIP-MCNC: NORMAL MG/DL
WBC # BLD AUTO: 6.3 10E3/UL (ref 4–11)
WBC URINE: 0 /HPF

## 2023-05-11 PROCEDURE — G0103 PSA SCREENING: HCPCS | Mod: ORL | Performed by: FAMILY MEDICINE

## 2023-05-11 PROCEDURE — 81001 URINALYSIS AUTO W/SCOPE: CPT | Mod: ORL | Performed by: FAMILY MEDICINE

## 2023-05-11 PROCEDURE — 85025 COMPLETE CBC W/AUTO DIFF WBC: CPT | Mod: ORL | Performed by: FAMILY MEDICINE

## 2023-05-11 PROCEDURE — 80061 LIPID PANEL: CPT | Mod: ORL | Performed by: FAMILY MEDICINE

## 2023-05-11 PROCEDURE — 80053 COMPREHEN METABOLIC PANEL: CPT | Mod: ORL | Performed by: FAMILY MEDICINE

## 2024-02-23 ENCOUNTER — TRANSCRIBE ORDERS (OUTPATIENT)
Dept: OTHER | Age: 79
End: 2024-02-23

## 2024-02-23 DIAGNOSIS — M25.561 RIGHT KNEE PAIN: Primary | ICD-10-CM

## 2024-02-28 ENCOUNTER — THERAPY VISIT (OUTPATIENT)
Dept: PHYSICAL THERAPY | Facility: CLINIC | Age: 79
End: 2024-02-28
Attending: FAMILY MEDICINE
Payer: COMMERCIAL

## 2024-02-28 DIAGNOSIS — M25.561 RIGHT KNEE PAIN, UNSPECIFIED CHRONICITY: Primary | ICD-10-CM

## 2024-02-28 PROCEDURE — 97161 PT EVAL LOW COMPLEX 20 MIN: CPT | Mod: GP | Performed by: PHYSICAL THERAPIST

## 2024-02-28 PROCEDURE — 97110 THERAPEUTIC EXERCISES: CPT | Mod: GP | Performed by: PHYSICAL THERAPIST

## 2024-02-28 ASSESSMENT — ACTIVITIES OF DAILY LIVING (ADL)
WALK: ACTIVITY IS MINIMALLY DIFFICULT
SWELLING: I DO NOT HAVE THE SYMPTOM
SIT WITH YOUR KNEE BENT: ACTIVITY IS NOT DIFFICULT
GO DOWN STAIRS: ACTIVITY IS SOMEWHAT DIFFICULT
SQUAT: I AM UNABLE TO DO THE ACTIVITY
KNEEL ON THE FRONT OF YOUR KNEE: I AM UNABLE TO DO THE ACTIVITY
KNEE_ACTIVITY_OF_DAILY_LIVING_SUM: 48
PAIN: THE SYMPTOM AFFECTS MY ACTIVITY MODERATELY
KNEE_ACTIVITY_OF_DAILY_LIVING_SCORE: 68.57
AS_A_RESULT_OF_YOUR_KNEE_INJURY,_HOW_WOULD_YOU_RATE_YOUR_CURRENT_LEVEL_OF_DAILY_ACTIVITY?: ABNORMAL
LIMPING: THE SYMPTOM AFFECTS MY ACTIVITY SLIGHTLY
STAND: ACTIVITY IS NOT DIFFICULT
STIFFNESS: THE SYMPTOM AFFECTS MY ACTIVITY SLIGHTLY
GIVING WAY, BUCKLING OR SHIFTING OF KNEE: I DO NOT HAVE THE SYMPTOM
GO UP STAIRS: ACTIVITY IS MINIMALLY DIFFICULT
RISE FROM A CHAIR: ACTIVITY IS MINIMALLY DIFFICULT
HOW_WOULD_YOU_RATE_THE_CURRENT_FUNCTION_OF_YOUR_KNEE_DURING_YOUR_USUAL_DAILY_ACTIVITIES_ON_A_SCALE_FROM_0_TO_100_WITH_100_BEING_YOUR_LEVEL_OF_KNEE_FUNCTION_PRIOR_TO_YOUR_INJURY_AND_0_BEING_THE_INABILITY_TO_PERFORM_ANY_OF_YOUR_USUAL_DAILY_ACTIVITIES?: 85
HOW_WOULD_YOU_RATE_THE_OVERALL_FUNCTION_OF_YOUR_KNEE_DURING_YOUR_USUAL_DAILY_ACTIVITIES?: NEARLY NORMAL
WEAKNESS: I DO NOT HAVE THE SYMPTOM
RAW_SCORE: 48

## 2024-02-28 NOTE — PROGRESS NOTES
PHYSICAL THERAPY EVALUATION  Type of Visit: Evaluation    See electronic medical record for Abuse and Falls Screening details.    Subjective       Presenting condition or subjective complaint: Right knee pain,  - 3 mile walking loop. one time came home with a lot of pain needed to walk with a cane. Now able ot walk without cane. He received a CSI from [employer], and was unsure if this helped. He did go for his walk without a cane, and noticed his gait was off, and he was walking slower. This caused minimum pain   Date of onset: 23    Relevant medical history:     Dates & types of surgery:      Prior diagnostic imaging/testing results:       Prior therapy history for the same diagnosis, illness or injury:        Prior Level of Function  Transfers: Independent  Ambulation: Independent  ADL: Independent  IADL: Driving, Finances, Housekeeping, Laundry, Meal preparation, Medication management    Living Environment  Social support:     Type of home:     Stairs to enter the home:         Ramp:     Stairs inside the home:         Help at home:    Equipment owned:       Employment:      Hobbies/Interests:      Patient goals for therapy:      Pain assessment: Location: Right medial knee along joint line/Ratin-3-8/10     Objective   KNEE EVALUATION  PAIN: Pain is Exacerbated By: Walking, stairs, squatting  Pain is Relieved By: rest  INTEGUMENTARY (edema, incisions): Sweep Test: 0 bilaterally     GAIT:  Gait Deviations: Stephanie decreased    WEIGHTBEARING ALIGNMENT: Knee WB Alignment:Genu varus L, Genu varus R    ROM:   (Degrees) Left AROM Left PROM  Right AROM Right PROM   Knee Flexion 125  125    Knee Extension Hyperextends 5   0    Pain:   End feel:     STRENGTH:   Pain: - none + mild ++ moderate +++ severe  Strength Scale: 0-5/5 Left Right   Knee Flexion 5 4+   Knee Extension 5 4   Quad Set 5 5-     FLEXIBILITY:  Right hamstring tightness  SPECIAL TESTS:    Left Right   Apley's (Meniscus) Negative  Negative     Odilia's (Meniscus) Negative  Negative    Medardo's (ITB/TFL) Negative  Negative    Patellar Apprehension Test Negative  Negative    Patella Tracking WFL Lateral displacement   Ligamentous Stability     Anterior Drawer (ACL) Negative Negative   Posterior Drawer (PCL) Negative Negative   Prone Dial Test at 30 Deg and 90 Deg (PCL/PLC) Negative Negative   Valgus Stress Testing at 0 Deg and 30 Deg Negative Negative   Varus Stress Testing at 0 Deg and 30 Deg  Negative Negative     FUNCTIONAL TESTS: Double Leg Squat: Anterior knee translation, Knee valgus, Hip internal rotation, and Improper use of glutes/hips  SLS: Contralateral hip drop on R  PALPATION:  NO joint line tenderness  JOINT MOBILITY:  stiffness lateral patella R    Assessment & Plan   CLINICAL IMPRESSIONS  Medical Diagnosis: Right knee pain    Treatment Diagnosis: signs and symptoms consistent with R knee OA   Impression/Assessment: Patient is a 78 year old male with Right knee pain complaints.  The following significant findings have been identified: Pain, Decreased ROM/flexibility, Decreased joint mobility, Decreased strength, Impaired balance, and Decreased proprioception. These impairments interfere with their ability to perform self care tasks, recreational activities, household chores, household mobility, and community mobility as compared to previous level of function.     Clinical Decision Making (Complexity):  Clinical Presentation: Stable/Uncomplicated  Clinical Presentation Rationale: based on medical and personal factors listed in PT evaluation  Clinical Decision Making (Complexity): Low complexity    PLAN OF CARE  Treatment Interventions:  Modalities: Cryotherapy, Dry Needling  Interventions: Gait Training, Manual Therapy, Neuromuscular Re-education, Therapeutic Activity, Therapeutic Exercise, Self-Care/Home Management    Long Term Goals     PT Goal 1  Goal Identifier: Ambulation  Goal Description: Patient will ambulate 3 miles on even ground  with AD and 0/10 pain  Rationale: to maximize safety and independence with performance of ADLs and functional tasks;to maximize safety and independence within the community;to maximize safety and independence with self cares  Target Date: 05/22/24      Frequency of Treatment: 1x per week 4 weeks, 2x per month 2 months  Duration of Treatment: 84 days    Recommended Referrals to Other Professionals:  None  Education Assessment:   Learner/Method: Patient;No Barriers to Learning    Risks and benefits of evaluation/treatment have been explained.   Patient/Family/caregiver agrees with Plan of Care.     Evaluation Time:     PT Eval, Low Complexity Minutes (13362): 20       Signing Clinician: Bozena Koenig PT

## 2024-03-25 ENCOUNTER — THERAPY VISIT (OUTPATIENT)
Dept: PHYSICAL THERAPY | Facility: CLINIC | Age: 79
End: 2024-03-25
Payer: COMMERCIAL

## 2024-03-25 DIAGNOSIS — M25.561 RIGHT KNEE PAIN, UNSPECIFIED CHRONICITY: Primary | ICD-10-CM

## 2024-03-25 PROCEDURE — 97110 THERAPEUTIC EXERCISES: CPT | Mod: GP | Performed by: PHYSICAL THERAPIST

## 2024-03-25 PROCEDURE — 97140 MANUAL THERAPY 1/> REGIONS: CPT | Mod: GP | Performed by: PHYSICAL THERAPIST

## 2024-04-29 ENCOUNTER — THERAPY VISIT (OUTPATIENT)
Dept: PHYSICAL THERAPY | Facility: CLINIC | Age: 79
End: 2024-04-29
Payer: COMMERCIAL

## 2024-04-29 DIAGNOSIS — M25.561 RIGHT KNEE PAIN, UNSPECIFIED CHRONICITY: Primary | ICD-10-CM

## 2024-04-29 PROCEDURE — 97110 THERAPEUTIC EXERCISES: CPT | Mod: GP | Performed by: PHYSICAL THERAPIST

## 2024-04-29 PROCEDURE — 97140 MANUAL THERAPY 1/> REGIONS: CPT | Mod: GP | Performed by: PHYSICAL THERAPIST

## 2024-05-14 ENCOUNTER — THERAPY VISIT (OUTPATIENT)
Dept: PHYSICAL THERAPY | Facility: CLINIC | Age: 79
End: 2024-05-14
Payer: COMMERCIAL

## 2024-05-14 DIAGNOSIS — M25.561 RIGHT KNEE PAIN, UNSPECIFIED CHRONICITY: Primary | ICD-10-CM

## 2024-05-14 PROCEDURE — 97530 THERAPEUTIC ACTIVITIES: CPT | Mod: GP | Performed by: PHYSICAL THERAPIST

## 2024-05-14 PROCEDURE — 97110 THERAPEUTIC EXERCISES: CPT | Mod: GP | Performed by: PHYSICAL THERAPIST

## 2024-05-20 ENCOUNTER — LAB REQUISITION (OUTPATIENT)
Dept: LAB | Facility: CLINIC | Age: 79
End: 2024-05-20
Payer: COMMERCIAL

## 2024-05-20 DIAGNOSIS — E78.5 HYPERLIPIDEMIA, UNSPECIFIED: ICD-10-CM

## 2024-05-20 DIAGNOSIS — R82.998 OTHER ABNORMAL FINDINGS IN URINE: ICD-10-CM

## 2024-05-20 LAB
ALBUMIN UR-MCNC: NEGATIVE MG/DL
ANION GAP SERPL CALCULATED.3IONS-SCNC: 12 MMOL/L (ref 7–15)
APPEARANCE UR: CLEAR
BILIRUB UR QL STRIP: NEGATIVE
BUN SERPL-MCNC: 21.2 MG/DL (ref 8–23)
CALCIUM SERPL-MCNC: 9.5 MG/DL (ref 8.8–10.2)
CHLORIDE SERPL-SCNC: 105 MMOL/L (ref 98–107)
CHOLEST SERPL-MCNC: 176 MG/DL
COLOR UR AUTO: ABNORMAL
CREAT SERPL-MCNC: 1.16 MG/DL (ref 0.67–1.17)
DEPRECATED HCO3 PLAS-SCNC: 23 MMOL/L (ref 22–29)
EGFRCR SERPLBLD CKD-EPI 2021: 64 ML/MIN/1.73M2
FASTING STATUS PATIENT QL REPORTED: ABNORMAL
FASTING STATUS PATIENT QL REPORTED: NORMAL
GLUCOSE SERPL-MCNC: 90 MG/DL (ref 70–99)
GLUCOSE UR STRIP-MCNC: NEGATIVE MG/DL
HDLC SERPL-MCNC: 28 MG/DL
HGB UR QL STRIP: NEGATIVE
KETONES UR STRIP-MCNC: NEGATIVE MG/DL
LDLC SERPL CALC-MCNC: 114 MG/DL
LEUKOCYTE ESTERASE UR QL STRIP: NEGATIVE
MUCOUS THREADS #/AREA URNS LPF: PRESENT /LPF
NITRATE UR QL: NEGATIVE
NONHDLC SERPL-MCNC: 148 MG/DL
PH UR STRIP: 5.5 [PH] (ref 5–7)
POTASSIUM SERPL-SCNC: 4 MMOL/L (ref 3.4–5.3)
RBC URINE: 1 /HPF
SODIUM SERPL-SCNC: 140 MMOL/L (ref 135–145)
SP GR UR STRIP: 1.02 (ref 1–1.03)
SQUAMOUS EPITHELIAL: <1 /HPF
TRIGL SERPL-MCNC: 171 MG/DL
UROBILINOGEN UR STRIP-MCNC: NORMAL MG/DL
WBC URINE: 1 /HPF

## 2024-05-20 PROCEDURE — 80048 BASIC METABOLIC PNL TOTAL CA: CPT | Mod: ORL | Performed by: INTERNAL MEDICINE

## 2024-05-20 PROCEDURE — 80061 LIPID PANEL: CPT | Mod: ORL | Performed by: INTERNAL MEDICINE

## 2024-05-20 PROCEDURE — 81001 URINALYSIS AUTO W/SCOPE: CPT | Mod: ORL | Performed by: INTERNAL MEDICINE

## 2024-05-28 ENCOUNTER — THERAPY VISIT (OUTPATIENT)
Dept: PHYSICAL THERAPY | Facility: CLINIC | Age: 79
End: 2024-05-28
Payer: COMMERCIAL

## 2024-05-28 DIAGNOSIS — M25.561 RIGHT KNEE PAIN, UNSPECIFIED CHRONICITY: Primary | ICD-10-CM

## 2024-05-28 PROCEDURE — 97110 THERAPEUTIC EXERCISES: CPT | Mod: GP | Performed by: PHYSICAL THERAPIST

## 2024-05-28 PROCEDURE — 97112 NEUROMUSCULAR REEDUCATION: CPT | Mod: GP | Performed by: PHYSICAL THERAPIST

## 2024-05-28 ASSESSMENT — ACTIVITIES OF DAILY LIVING (ADL)
KNEEL ON THE FRONT OF YOUR KNEE: ACTIVITY IS FAIRLY DIFFICULT
HOW_WOULD_YOU_RATE_THE_CURRENT_FUNCTION_OF_YOUR_KNEE_DURING_YOUR_USUAL_DAILY_ACTIVITIES_ON_A_SCALE_FROM_0_TO_100_WITH_100_BEING_YOUR_LEVEL_OF_KNEE_FUNCTION_PRIOR_TO_YOUR_INJURY_AND_0_BEING_THE_INABILITY_TO_PERFORM_ANY_OF_YOUR_USUAL_DAILY_ACTIVITIES?: 85
STAND: ACTIVITY IS NOT DIFFICULT
SIT WITH YOUR KNEE BENT: ACTIVITY IS NOT DIFFICULT
GO UP STAIRS: ACTIVITY IS SOMEWHAT DIFFICULT
WEAKNESS: I DO NOT HAVE THE SYMPTOM
STIFFNESS: I HAVE THE SYMPTOM BUT IT DOES NOT AFFECT MY ACTIVITY
AS_A_RESULT_OF_YOUR_KNEE_INJURY,_HOW_WOULD_YOU_RATE_YOUR_CURRENT_LEVEL_OF_DAILY_ACTIVITY?: NEARLY NORMAL
RAW_SCORE: 57
SQUAT: ACTIVITY IS NOT DIFFICULT
HOW_WOULD_YOU_RATE_THE_OVERALL_FUNCTION_OF_YOUR_KNEE_DURING_YOUR_USUAL_DAILY_ACTIVITIES?: NEARLY NORMAL
LIMPING: I HAVE THE SYMPTOM BUT IT DOES NOT AFFECT MY ACTIVITY
RISE FROM A CHAIR: ACTIVITY IS NOT DIFFICULT
WALK: ACTIVITY IS MINIMALLY DIFFICULT
GIVING WAY, BUCKLING OR SHIFTING OF KNEE: I DO NOT HAVE THE SYMPTOM
KNEE_ACTIVITY_OF_DAILY_LIVING_SCORE: 81.43
PAIN: THE SYMPTOM AFFECTS MY ACTIVITY SLIGHTLY
KNEE_ACTIVITY_OF_DAILY_LIVING_SUM: 57
GO DOWN STAIRS: ACTIVITY IS FAIRLY DIFFICULT
SWELLING: I DO NOT HAVE THE SYMPTOM

## 2024-06-11 ENCOUNTER — THERAPY VISIT (OUTPATIENT)
Dept: PHYSICAL THERAPY | Facility: CLINIC | Age: 79
End: 2024-06-11
Payer: COMMERCIAL

## 2024-06-11 DIAGNOSIS — M25.561 RIGHT KNEE PAIN, UNSPECIFIED CHRONICITY: Primary | ICD-10-CM

## 2024-06-11 PROCEDURE — 97110 THERAPEUTIC EXERCISES: CPT | Mod: GP | Performed by: PHYSICAL THERAPIST

## 2024-06-11 NOTE — PROGRESS NOTES
DISCHARGE  Reason for Discharge: Patient has met all goals.    Equipment Issued: HEP    Discharge Plan: Patient to continue home program.    Referring Provider:  Mariano Valdez

## 2024-06-16 ENCOUNTER — HEALTH MAINTENANCE LETTER (OUTPATIENT)
Age: 79
End: 2024-06-16

## 2024-06-25 ENCOUNTER — LAB REQUISITION (OUTPATIENT)
Dept: LAB | Facility: CLINIC | Age: 79
End: 2024-06-25
Payer: COMMERCIAL

## 2024-06-25 DIAGNOSIS — Z12.5 ENCOUNTER FOR SCREENING FOR MALIGNANT NEOPLASM OF PROSTATE: ICD-10-CM

## 2024-06-25 LAB — PSA SERPL DL<=0.01 NG/ML-MCNC: 0.86 NG/ML (ref 0–6.5)

## 2024-06-25 PROCEDURE — G0103 PSA SCREENING: HCPCS | Mod: ORL | Performed by: INTERNAL MEDICINE

## 2025-01-25 ASSESSMENT — ACTIVITIES OF DAILY LIVING (ADL)
STAND: ACTIVITY IS NOT DIFFICULT
PAIN: THE SYMPTOM AFFECTS MY ACTIVITY MODERATELY
SWELLING: I DO NOT HAVE THE SYMPTOM
SWELLING: I DO NOT HAVE THE SYMPTOM
GIVING WAY, BUCKLING OR SHIFTING OF KNEE: I HAVE THE SYMPTOM BUT IT DOES NOT AFFECT MY ACTIVITY
KNEEL ON THE FRONT OF YOUR KNEE: ACTIVITY IS MINIMALLY DIFFICULT
GO DOWN STAIRS: ACTIVITY IS FAIRLY DIFFICULT
AS_A_RESULT_OF_YOUR_KNEE_INJURY,_HOW_WOULD_YOU_RATE_YOUR_CURRENT_LEVEL_OF_DAILY_ACTIVITY?: ABNORMAL
LIMPING: I HAVE THE SYMPTOM BUT IT DOES NOT AFFECT MY ACTIVITY
WALK: ACTIVITY IS SOMEWHAT DIFFICULT
HOW_WOULD_YOU_RATE_THE_CURRENT_FUNCTION_OF_YOUR_KNEE_DURING_YOUR_USUAL_DAILY_ACTIVITIES_ON_A_SCALE_FROM_0_TO_100_WITH_100_BEING_YOUR_LEVEL_OF_KNEE_FUNCTION_PRIOR_TO_YOUR_INJURY_AND_0_BEING_THE_INABILITY_TO_PERFORM_ANY_OF_YOUR_USUAL_DAILY_ACTIVITIES?: 80
WEAKNESS: I DO NOT HAVE THE SYMPTOM
SIT WITH YOUR KNEE BENT: ACTIVITY IS NOT DIFFICULT
AS_A_RESULT_OF_YOUR_KNEE_INJURY,_HOW_WOULD_YOU_RATE_YOUR_CURRENT_LEVEL_OF_DAILY_ACTIVITY?: ABNORMAL
HOW_WOULD_YOU_RATE_THE_CURRENT_FUNCTION_OF_YOUR_KNEE_DURING_YOUR_USUAL_DAILY_ACTIVITIES_ON_A_SCALE_FROM_0_TO_100_WITH_100_BEING_YOUR_LEVEL_OF_KNEE_FUNCTION_PRIOR_TO_YOUR_INJURY_AND_0_BEING_THE_INABILITY_TO_PERFORM_ANY_OF_YOUR_USUAL_DAILY_ACTIVITIES?: 80
HOW_WOULD_YOU_RATE_THE_OVERALL_FUNCTION_OF_YOUR_KNEE_DURING_YOUR_USUAL_DAILY_ACTIVITIES?: NEARLY NORMAL
PLEASE_INDICATE_YOR_PRIMARY_REASON_FOR_REFERRAL_TO_THERAPY:: KNEE
KNEE_ACTIVITY_OF_DAILY_LIVING_SCORE: 80
SQUAT: ACTIVITY IS MINIMALLY DIFFICULT
LIMPING: I HAVE THE SYMPTOM BUT IT DOES NOT AFFECT MY ACTIVITY
STIFFNESS: I DO NOT HAVE THE SYMPTOM
GO UP STAIRS: ACTIVITY IS SOMEWHAT DIFFICULT
SQUAT: ACTIVITY IS MINIMALLY DIFFICULT
KNEE_ACTIVITY_OF_DAILY_LIVING_SUM: 56
GO DOWN STAIRS: ACTIVITY IS FAIRLY DIFFICULT
GIVING WAY, BUCKLING OR SHIFTING OF KNEE: I HAVE THE SYMPTOM BUT IT DOES NOT AFFECT MY ACTIVITY
GO UP STAIRS: ACTIVITY IS SOMEWHAT DIFFICULT
RISE FROM A CHAIR: ACTIVITY IS NOT DIFFICULT
SIT WITH YOUR KNEE BENT: ACTIVITY IS NOT DIFFICULT
WEAKNESS: I DO NOT HAVE THE SYMPTOM
HOW_WOULD_YOU_RATE_THE_OVERALL_FUNCTION_OF_YOUR_KNEE_DURING_YOUR_USUAL_DAILY_ACTIVITIES?: NEARLY NORMAL
STAND: ACTIVITY IS NOT DIFFICULT
KNEEL ON THE FRONT OF YOUR KNEE: ACTIVITY IS MINIMALLY DIFFICULT
RISE FROM A CHAIR: ACTIVITY IS NOT DIFFICULT
STIFFNESS: I DO NOT HAVE THE SYMPTOM
RAW_SCORE: 56
WALK: ACTIVITY IS SOMEWHAT DIFFICULT
PAIN: THE SYMPTOM AFFECTS MY ACTIVITY MODERATELY

## 2025-01-27 ENCOUNTER — THERAPY VISIT (OUTPATIENT)
Dept: PHYSICAL THERAPY | Facility: CLINIC | Age: 80
End: 2025-01-27
Payer: COMMERCIAL

## 2025-01-27 DIAGNOSIS — M25.561 CHRONIC PAIN OF RIGHT KNEE: Primary | ICD-10-CM

## 2025-01-27 DIAGNOSIS — G89.29 CHRONIC PAIN OF RIGHT KNEE: Primary | ICD-10-CM

## 2025-01-27 PROCEDURE — 97110 THERAPEUTIC EXERCISES: CPT | Mod: GP

## 2025-01-27 PROCEDURE — 97161 PT EVAL LOW COMPLEX 20 MIN: CPT | Mod: GP

## 2025-01-27 NOTE — PROGRESS NOTES
PHYSICAL THERAPY EVALUATION  Type of Visit: Evaluation       Fall Risk Screen:  Fall screen completed by: PT  Have you fallen 2 or more times in the past year?: (Patient-Rptd) No  Have you fallen and had an injury in the past year?: (Patient-Rptd) No  Is patient a fall risk?: No    Subjective     Patient presents with continued right knee pain. He had been working on his PT exercises from last year, but stopped them in the fall while he was getting PRP injections. He just started trying to get back into his normal walking routine and easing back into PT exercises. He went for a mile walk the other day, he had some pain during the whole walk, but it did not worsen as the walk continued. Ideally he would like to walk 3 miles at a time. He would like some guidance on current PT routine.         Presenting condition or subjective complaint: pain when walking  Date of onset: 02/23/24 (referral date)    Relevant medical history: Arthritis   Dates & types of surgery:      Prior diagnostic imaging/testing results: X-ray     Prior therapy history for the same diagnosis, illness or injury: Yes PT first half of 2024    Prior Level of Function  Transfers: Independent  Ambulation: Independent  ADL: Independent  IADL:  Independent    Living Environment  Social support: Alone   Type of home: House   Stairs to enter the home:         Ramp: No   Stairs inside the home: Yes 2 Is there a railing: Yes     Help at home: None  Equipment owned: Straight Cane     Employment: Not Applicable    Hobbies/Interests: reading    Patient goals for therapy: walk several miles every day, as I used to do    Pain assessment: See objective evaluation for additional pain details     Objective     KNEE EVALUATION  PAIN: Pain Level at Best: 0/10  Pain Level at Worst: 3/10  Pain Location: medial/anterior knee  Pain Quality: Aching  Other symptoms: occassional pop or click  Pain Frequency: intermittent  Time dependent: no  Pain is Exacerbated By: walking,  stairs especially going down  Pain is Relieved By: rest  Pain Progression: Improved  INTEGUMENTARY (edema, incisions):   POSTURE:   GAIT:  Weightbearing Status:   Assistive Device(s): None  Gait Deviations: WNL  BALANCE/PROPRIOCEPTION: Single Leg Stance Eyes Open (seconds): <10 sec bilateral  WEIGHTBEARING ALIGNMENT:   NON-WEIGHTBEARING ALIGNMENT:   ROM:   Lower Extremity ROM   Left (PROM) Left (AROM) Right (PROM) Right (AROM)   Hip Flexion WNL  WNL    Hip Extension       Hip ER WNL  WNL    Hip IR WNL  WNL    Hip ABD       Hip ADD       Knee Hyperextension        Knee Extension  0  0   Knee Flexion  117  117   No pain end ranges knee ROM    STRENGTH:    Left Right   Knee Ext 5 5   Knee Flex 5 5   Quad Set Normal Normal   *indicates pain    FLEXIBILITY: hamstring tightness R>L    SPECIAL TESTS:     FUNCTIONAL TESTS:   Double Leg Squat: Anterior knee translation and Improper use of glutes/hips, limited depth  Single Leg Squat: NT  STS: completes 10x w/o UE support, no knee pain    PALPATION: No TPP        Assessment & Plan   CLINICAL IMPRESSIONS  Medical Diagnosis: right knee pain    Treatment Diagnosis: chronic right knee pain   Impression/Assessment: Patient is a 79 year old male with right knee complaints.  The following significant findings have been identified: Pain, Decreased ROM/flexibility, Decreased strength, Impaired balance, and Decreased activity tolerance. These impairments interfere with their ability to perform self care tasks, recreational activities, household chores, household mobility, and community mobility as compared to previous level of function.     Clinical Decision Making (Complexity):  Clinical Presentation: Stable/Uncomplicated  Clinical Presentation Rationale: based on medical and personal factors listed in PT evaluation  Clinical Decision Making (Complexity): Low complexity    PLAN OF CARE  Treatment Interventions:  Interventions: Manual Therapy, Neuromuscular Re-education, Therapeutic  Activity, Therapeutic Exercise, Self-Care/Home Management    Long Term Goals     PT Goal 1  Goal Identifier: Walking  Goal Description: Patient will be able to walk for at least 3 miles w/o increase in pain  Rationale: to maximize safety and independence with performance of ADLs and functional tasks;to maximize safety and independence within the home;to maximize safety and independence within the community  Target Date: 04/21/25      Frequency of Treatment: 1x/month  Duration of Treatment: 12 weeks    Recommended Referrals to Other Professionals:  none at this time  Education Assessment:   Learner/Method: Patient;Demonstration;Pictures/Video;No Barriers to Learning    Risks and benefits of evaluation/treatment have been explained.   Patient/Family/caregiver agrees with Plan of Care.     Evaluation Time:     PT Eval, Low Complexity Minutes (57553): 20     Signing Clinician: MONET Dominguez [facility]                                                                                   OUTPATIENT PHYSICAL THERAPY    PLAN OF TREATMENT FOR OUTPATIENT REHABILITATION   Patient's Last Name, First Name, Les Ivy YOB: 1945   Provider's Name   [facility]   Medical Record No.  [id number]     Onset Date: 02/23/24 (referral date)  Start of Care Date: 01/27/25     Medical Diagnosis:  right knee pain      PT Treatment Diagnosis:  chronic right knee pain Plan of Treatment  Frequency/Duration: 1x/month/ 12 weeks    Certification date from 01/27/25 to 04/21/25         See note for plan of treatment details and functional goals     Eneida Murrieta PT                         I CERTIFY THE NEED FOR THESE SERVICES FURNISHED UNDER        THIS PLAN OF TREATMENT AND WHILE UNDER MY CARE .             Physician Signature               Date    X_____________________________________________________                  Referring Provider:  Mariano HULL  [facility]    PCP:  Jessica Harper MD    Initial Assessment  See Epic Evaluation- Start of Care Date: 01/27/25

## 2025-03-25 ENCOUNTER — THERAPY VISIT (OUTPATIENT)
Dept: PHYSICAL THERAPY | Facility: CLINIC | Age: 80
End: 2025-03-25
Payer: COMMERCIAL

## 2025-03-25 DIAGNOSIS — M25.561 CHRONIC PAIN OF RIGHT KNEE: Primary | ICD-10-CM

## 2025-03-25 DIAGNOSIS — G89.29 CHRONIC PAIN OF RIGHT KNEE: Primary | ICD-10-CM

## 2025-03-25 PROCEDURE — 97110 THERAPEUTIC EXERCISES: CPT | Mod: GP

## 2025-04-24 ENCOUNTER — THERAPY VISIT (OUTPATIENT)
Dept: PHYSICAL THERAPY | Facility: CLINIC | Age: 80
End: 2025-04-24
Payer: COMMERCIAL

## 2025-04-24 DIAGNOSIS — G89.29 CHRONIC PAIN OF RIGHT KNEE: Primary | ICD-10-CM

## 2025-04-24 DIAGNOSIS — M25.561 CHRONIC PAIN OF RIGHT KNEE: Primary | ICD-10-CM

## 2025-06-21 ENCOUNTER — HEALTH MAINTENANCE LETTER (OUTPATIENT)
Age: 80
End: 2025-06-21

## 2025-07-24 ENCOUNTER — LAB REQUISITION (OUTPATIENT)
Dept: LAB | Facility: CLINIC | Age: 80
End: 2025-07-24
Payer: COMMERCIAL

## 2025-07-24 DIAGNOSIS — Z13.6 ENCOUNTER FOR SCREENING FOR CARDIOVASCULAR DISORDERS: ICD-10-CM

## 2025-07-24 DIAGNOSIS — I10 ESSENTIAL (PRIMARY) HYPERTENSION: ICD-10-CM

## 2025-07-24 DIAGNOSIS — Z12.5 ENCOUNTER FOR SCREENING FOR MALIGNANT NEOPLASM OF PROSTATE: ICD-10-CM

## 2025-07-24 DIAGNOSIS — Z00.01 ENCOUNTER FOR GENERAL ADULT MEDICAL EXAMINATION WITH ABNORMAL FINDINGS: ICD-10-CM

## 2025-07-24 DIAGNOSIS — Z79.899 OTHER LONG TERM (CURRENT) DRUG THERAPY: ICD-10-CM

## 2025-07-24 LAB
BASOPHILS # BLD AUTO: 0.1 10E3/UL (ref 0–0.2)
BASOPHILS NFR BLD AUTO: 1 %
EOSINOPHIL # BLD AUTO: 0.2 10E3/UL (ref 0–0.7)
EOSINOPHIL NFR BLD AUTO: 3 %
ERYTHROCYTE [DISTWIDTH] IN BLOOD BY AUTOMATED COUNT: 12.7 % (ref 10–15)
HCT VFR BLD AUTO: 47.1 % (ref 40–53)
HGB BLD-MCNC: 15.2 G/DL (ref 13.3–17.7)
IMM GRANULOCYTES # BLD: 0 10E3/UL
IMM GRANULOCYTES NFR BLD: 0 %
LYMPHOCYTES # BLD AUTO: 1.1 10E3/UL (ref 0.8–5.3)
LYMPHOCYTES NFR BLD AUTO: 16 %
MCH RBC QN AUTO: 30.8 PG (ref 26.5–33)
MCHC RBC AUTO-ENTMCNC: 32.3 G/DL (ref 31.5–36.5)
MCV RBC AUTO: 96 FL (ref 78–100)
MONOCYTES # BLD AUTO: 0.8 10E3/UL (ref 0–1.3)
MONOCYTES NFR BLD AUTO: 11 %
NEUTROPHILS # BLD AUTO: 4.7 10E3/UL (ref 1.6–8.3)
NEUTROPHILS NFR BLD AUTO: 68 %
NRBC # BLD AUTO: 0 10E3/UL
NRBC BLD AUTO-RTO: 0 /100
PLATELET # BLD AUTO: 246 10E3/UL (ref 150–450)
RBC # BLD AUTO: 4.93 10E6/UL (ref 4.4–5.9)
WBC # BLD AUTO: 6.9 10E3/UL (ref 4–11)

## 2025-07-24 PROCEDURE — 80061 LIPID PANEL: CPT | Mod: ORL | Performed by: INTERNAL MEDICINE

## 2025-07-24 PROCEDURE — 85025 COMPLETE CBC W/AUTO DIFF WBC: CPT | Mod: ORL | Performed by: INTERNAL MEDICINE

## 2025-07-24 PROCEDURE — G0103 PSA SCREENING: HCPCS | Mod: ORL | Performed by: INTERNAL MEDICINE

## 2025-07-24 PROCEDURE — 80053 COMPREHEN METABOLIC PANEL: CPT | Mod: ORL | Performed by: INTERNAL MEDICINE

## 2025-07-25 LAB
ALBUMIN SERPL BCG-MCNC: 4.3 G/DL (ref 3.5–5.2)
ALP SERPL-CCNC: 48 U/L (ref 40–150)
ALT SERPL W P-5'-P-CCNC: 17 U/L (ref 0–70)
ANION GAP SERPL CALCULATED.3IONS-SCNC: 11 MMOL/L (ref 7–15)
AST SERPL W P-5'-P-CCNC: 10 U/L (ref 0–45)
BILIRUB SERPL-MCNC: 1.5 MG/DL
BUN SERPL-MCNC: 19.8 MG/DL (ref 8–23)
CALCIUM SERPL-MCNC: 9.7 MG/DL (ref 8.8–10.4)
CHLORIDE SERPL-SCNC: 105 MMOL/L (ref 98–107)
CHOLEST SERPL-MCNC: 174 MG/DL
CREAT SERPL-MCNC: 1.12 MG/DL (ref 0.67–1.17)
EGFRCR SERPLBLD CKD-EPI 2021: 66 ML/MIN/1.73M2
FASTING STATUS PATIENT QL REPORTED: ABNORMAL
FASTING STATUS PATIENT QL REPORTED: ABNORMAL
GLUCOSE SERPL-MCNC: 95 MG/DL (ref 70–99)
HCO3 SERPL-SCNC: 25 MMOL/L (ref 22–29)
HDLC SERPL-MCNC: 27 MG/DL
LDLC SERPL CALC-MCNC: 116 MG/DL
NONHDLC SERPL-MCNC: 147 MG/DL
POTASSIUM SERPL-SCNC: 3.9 MMOL/L (ref 3.4–5.3)
PROT SERPL-MCNC: 7.2 G/DL (ref 6.4–8.3)
PSA SERPL DL<=0.01 NG/ML-MCNC: 0.99 NG/ML
SODIUM SERPL-SCNC: 141 MMOL/L (ref 135–145)
TRIGL SERPL-MCNC: 156 MG/DL

## (undated) DEVICE — STRAP STIRRUP W/SLIP 30187-030

## (undated) DEVICE — GLOVE PROTEXIS BLUE W/NEU-THERA 8.0  2D73EB80

## (undated) DEVICE — LINEN TOWEL PACK X5 5464

## (undated) DEVICE — DRSG KERLIX 4 1/2"X4YDS ROLL 6730

## (undated) DEVICE — STRAP KNEE/BODY 31143004

## (undated) DEVICE — SUCTION MANIFOLD DORNOCH ULTRA CART UL-CL500

## (undated) DEVICE — GLOVE PROTEXIS W/NEU-THERA 8.0  2D73TE80

## (undated) DEVICE — SUCTION IRR SYSTEM W/O TIP INTERPULSE HANDPIECE 0210-100-000

## (undated) DEVICE — DRSG AQUACEL AG 3.5X9.75" HYDROFIBER 412011

## (undated) DEVICE — SOL NACL 0.9% IRRIG 1000ML BOTTLE 2F7124

## (undated) DEVICE — DRAPE IOBAN INCISE 36X23" 6651EZ

## (undated) DEVICE — DRILL BIT FLEXIBLE REFLECTION 35MM 71362935

## (undated) DEVICE — SU VICRYL 0 CT 36" J358H

## (undated) DEVICE — SU DERMABOND ADVANCED .7ML DNX12

## (undated) DEVICE — SU VICRYL 2-0 CT-1 27" UND J259H

## (undated) DEVICE — SPONGE LAP 18X18" X8435

## (undated) DEVICE — GOWN IMPERVIOUS SPECIALTY XLG/XLONG 32474

## (undated) DEVICE — DRAPE STERI TOWEL LG 1010

## (undated) DEVICE — DRSG STERI STRIP 1/2X4" R1547

## (undated) DEVICE — ESU GROUND PAD ADULT W/CORD E7507

## (undated) DEVICE — BLADE SAW RECIP STRK 70X6X0.025MM 0277-096-250S5

## (undated) DEVICE — DEVICE RETRIEVER HEWSON 71111579

## (undated) DEVICE — LINEN MAYO STAND COVER OVERSIZE PACK 5458

## (undated) DEVICE — SOL NACL 0.9% INJ 1000ML BAG 2B1324X

## (undated) DEVICE — PREP CHLORAPREP 26ML TINTED ORANGE  260815

## (undated) DEVICE — GOWN XLG DISP 9545

## (undated) DEVICE — PACK TOTAL HIP W/POUCH RIVERSIDE LATEX FREE

## (undated) DEVICE — LINEN ORTHO PACK 5446

## (undated) DEVICE — MITT SURGICAL PREP HAIR REMOVER LATEX FREE SPM100

## (undated) DEVICE — HOOD FLYTE W/PEELAWAY 408-800-100

## (undated) DEVICE — SOL WATER IRRIG 1000ML BOTTLE 2F7114

## (undated) DEVICE — SU MONOCRYL 3-0 PS-1 27" Y936H

## (undated) DEVICE — POSITIONER ABDUCTION PILLOW FOAM MED FP-ABDUCTM

## (undated) DEVICE — BLADE CLIPPER SGL USE 9680

## (undated) DEVICE — SU ETHIBOND 5 V-37 4X30" MB66G

## (undated) DEVICE — BONE CLEANING TIP INTERPULSE  0210-010-000

## (undated) DEVICE — GLOVE PROTEXIS POWDER FREE 7.5 ORTHOPEDIC 2D73ET75

## (undated) DEVICE — BLADE KNIFE SURG 20 371120

## (undated) RX ORDER — FENTANYL CITRATE 50 UG/ML
INJECTION, SOLUTION INTRAMUSCULAR; INTRAVENOUS
Status: DISPENSED
Start: 2019-05-01

## (undated) RX ORDER — LIDOCAINE HYDROCHLORIDE 20 MG/ML
INJECTION, SOLUTION EPIDURAL; INFILTRATION; INTRACAUDAL; PERINEURAL
Status: DISPENSED
Start: 2019-05-01

## (undated) RX ORDER — PHENYLEPHRINE HCL IN 0.9% NACL 1 MG/10 ML
SYRINGE (ML) INTRAVENOUS
Status: DISPENSED
Start: 2019-05-01

## (undated) RX ORDER — PROPOFOL 10 MG/ML
INJECTION, EMULSION INTRAVENOUS
Status: DISPENSED
Start: 2019-05-01

## (undated) RX ORDER — KETOROLAC TROMETHAMINE 30 MG/ML
INJECTION, SOLUTION INTRAMUSCULAR; INTRAVENOUS
Status: DISPENSED
Start: 2019-05-01

## (undated) RX ORDER — ACETAMINOPHEN 325 MG/1
TABLET ORAL
Status: DISPENSED
Start: 2019-05-01

## (undated) RX ORDER — HYDROMORPHONE HYDROCHLORIDE 1 MG/ML
INJECTION, SOLUTION INTRAMUSCULAR; INTRAVENOUS; SUBCUTANEOUS
Status: DISPENSED
Start: 2019-05-01

## (undated) RX ORDER — GABAPENTIN 100 MG/1
CAPSULE ORAL
Status: DISPENSED
Start: 2019-05-01

## (undated) RX ORDER — DEXAMETHASONE SODIUM PHOSPHATE 4 MG/ML
INJECTION, SOLUTION INTRA-ARTICULAR; INTRALESIONAL; INTRAMUSCULAR; INTRAVENOUS; SOFT TISSUE
Status: DISPENSED
Start: 2019-05-01

## (undated) RX ORDER — CEFAZOLIN SODIUM 1 G/3ML
INJECTION, POWDER, FOR SOLUTION INTRAMUSCULAR; INTRAVENOUS
Status: DISPENSED
Start: 2019-05-01

## (undated) RX ORDER — CEFAZOLIN SODIUM 2 G/100ML
INJECTION, SOLUTION INTRAVENOUS
Status: DISPENSED
Start: 2019-05-01

## (undated) RX ORDER — CELECOXIB 200 MG/1
CAPSULE ORAL
Status: DISPENSED
Start: 2019-05-01

## (undated) RX ORDER — ONDANSETRON 2 MG/ML
INJECTION INTRAMUSCULAR; INTRAVENOUS
Status: DISPENSED
Start: 2019-05-01